# Patient Record
Sex: FEMALE
[De-identification: names, ages, dates, MRNs, and addresses within clinical notes are randomized per-mention and may not be internally consistent; named-entity substitution may affect disease eponyms.]

---

## 2017-02-13 ENCOUNTER — FORM ENCOUNTER (OUTPATIENT)
Age: 67
End: 2017-02-13

## 2020-08-12 ENCOUNTER — TRANSCRIPTION ENCOUNTER (OUTPATIENT)
Age: 70
End: 2020-08-12

## 2022-06-26 ENCOUNTER — FORM ENCOUNTER (OUTPATIENT)
Age: 72
End: 2022-06-26

## 2022-07-22 ENCOUNTER — OUTPATIENT (OUTPATIENT)
Dept: OUTPATIENT SERVICES | Facility: HOSPITAL | Age: 72
LOS: 1 days | End: 2022-07-22
Payer: MEDICARE

## 2022-07-22 VITALS
DIASTOLIC BLOOD PRESSURE: 82 MMHG | SYSTOLIC BLOOD PRESSURE: 159 MMHG | OXYGEN SATURATION: 96 % | RESPIRATION RATE: 16 BRPM | HEART RATE: 88 BPM | TEMPERATURE: 97 F | WEIGHT: 279.99 LBS

## 2022-07-22 DIAGNOSIS — Z98.890 OTHER SPECIFIED POSTPROCEDURAL STATES: Chronic | ICD-10-CM

## 2022-07-22 DIAGNOSIS — N85.00 ENDOMETRIAL HYPERPLASIA, UNSPECIFIED: ICD-10-CM

## 2022-07-22 DIAGNOSIS — Z01.818 ENCOUNTER FOR OTHER PREPROCEDURAL EXAMINATION: ICD-10-CM

## 2022-07-22 DIAGNOSIS — E89.2 POSTPROCEDURAL HYPOPARATHYROIDISM: Chronic | ICD-10-CM

## 2022-07-22 LAB
ALBUMIN SERPL ELPH-MCNC: 3.5 G/DL — SIGNIFICANT CHANGE UP (ref 3.3–5)
ALP SERPL-CCNC: 87 U/L — SIGNIFICANT CHANGE UP (ref 40–120)
ALT FLD-CCNC: 17 U/L — SIGNIFICANT CHANGE UP (ref 12–78)
ANION GAP SERPL CALC-SCNC: 4 MMOL/L — LOW (ref 5–17)
AST SERPL-CCNC: 16 U/L — SIGNIFICANT CHANGE UP (ref 15–37)
BILIRUB SERPL-MCNC: 0.2 MG/DL — SIGNIFICANT CHANGE UP (ref 0.2–1.2)
BLD GP AB SCN SERPL QL: SIGNIFICANT CHANGE UP
BUN SERPL-MCNC: 18 MG/DL — SIGNIFICANT CHANGE UP (ref 7–23)
CALCIUM SERPL-MCNC: 8.7 MG/DL — SIGNIFICANT CHANGE UP (ref 8.5–10.1)
CHLORIDE SERPL-SCNC: 107 MMOL/L — SIGNIFICANT CHANGE UP (ref 96–108)
CO2 SERPL-SCNC: 31 MMOL/L — SIGNIFICANT CHANGE UP (ref 22–31)
CREAT SERPL-MCNC: 1.4 MG/DL — HIGH (ref 0.5–1.3)
EGFR: 40 ML/MIN/1.73M2 — LOW
GLUCOSE SERPL-MCNC: 93 MG/DL — SIGNIFICANT CHANGE UP (ref 70–99)
HCT VFR BLD CALC: 41.5 % — SIGNIFICANT CHANGE UP (ref 34.5–45)
HGB BLD-MCNC: 13.6 G/DL — SIGNIFICANT CHANGE UP (ref 11.5–15.5)
MCHC RBC-ENTMCNC: 30.8 PG — SIGNIFICANT CHANGE UP (ref 27–34)
MCHC RBC-ENTMCNC: 32.8 GM/DL — SIGNIFICANT CHANGE UP (ref 32–36)
MCV RBC AUTO: 94.1 FL — SIGNIFICANT CHANGE UP (ref 80–100)
NRBC # BLD: 0 /100 WBCS — SIGNIFICANT CHANGE UP (ref 0–0)
PLATELET # BLD AUTO: 218 K/UL — SIGNIFICANT CHANGE UP (ref 150–400)
POTASSIUM SERPL-MCNC: 4.5 MMOL/L — SIGNIFICANT CHANGE UP (ref 3.5–5.3)
POTASSIUM SERPL-SCNC: 4.5 MMOL/L — SIGNIFICANT CHANGE UP (ref 3.5–5.3)
PROT SERPL-MCNC: 7 G/DL — SIGNIFICANT CHANGE UP (ref 6–8.3)
RBC # BLD: 4.41 M/UL — SIGNIFICANT CHANGE UP (ref 3.8–5.2)
RBC # FLD: 13.2 % — SIGNIFICANT CHANGE UP (ref 10.3–14.5)
SARS-COV-2 RNA SPEC QL NAA+PROBE: SIGNIFICANT CHANGE UP
SODIUM SERPL-SCNC: 142 MMOL/L — SIGNIFICANT CHANGE UP (ref 135–145)
WBC # BLD: 7.84 K/UL — SIGNIFICANT CHANGE UP (ref 3.8–10.5)
WBC # FLD AUTO: 7.84 K/UL — SIGNIFICANT CHANGE UP (ref 3.8–10.5)

## 2022-07-22 PROCEDURE — 36415 COLL VENOUS BLD VENIPUNCTURE: CPT

## 2022-07-22 PROCEDURE — 86850 RBC ANTIBODY SCREEN: CPT

## 2022-07-22 PROCEDURE — 86901 BLOOD TYPING SEROLOGIC RH(D): CPT

## 2022-07-22 PROCEDURE — 85027 COMPLETE CBC AUTOMATED: CPT

## 2022-07-22 PROCEDURE — G0463: CPT

## 2022-07-22 PROCEDURE — 86900 BLOOD TYPING SEROLOGIC ABO: CPT

## 2022-07-22 PROCEDURE — U0005: CPT

## 2022-07-22 PROCEDURE — 80053 COMPREHEN METABOLIC PANEL: CPT

## 2022-07-22 PROCEDURE — U0003: CPT

## 2022-07-22 RX ORDER — VALSARTAN 80 MG/1
0 TABLET ORAL
Qty: 0 | Refills: 0 | DISCHARGE

## 2022-07-22 RX ORDER — LEVOTHYROXINE SODIUM 125 MCG
0 TABLET ORAL
Qty: 0 | Refills: 0 | DISCHARGE

## 2022-07-22 NOTE — H&P PST ADULT - NSICDXFAMILYHX_GEN_ALL_CORE_FT
FAMILY HISTORY:  Father  Still living? No  FH: bladder cancer, Age at diagnosis: Age Unknown  FH: CAD (coronary artery disease), Age at diagnosis: Age Unknown    Mother  Still living? No  FH: brain aneurysm, Age at diagnosis: Age Unknown

## 2022-07-22 NOTE — H&P PST ADULT - NSICDXPASTMEDICALHX_GEN_ALL_CORE_FT
PAST MEDICAL HISTORY:  Hypothyroidism      PAST MEDICAL HISTORY:  Endometrial hyperplasia     Hypertension     Hypothyroidism

## 2022-07-22 NOTE — H&P PST ADULT - HISTORY OF PRESENT ILLNESS
73 y/o female with PMH of HTN and hypothyroidism here for PST. Pt states "I had a twinge of abdominal pain about 2 months ago and then I saw my GYN". Pt s/p sonogram and pt states "there was an endometrial stripe". Pt denies postmenopausal bleeding and vaginal cramping. Pt electing for dilation and curettage hysteroscopy with myosure on 7/27/2022.

## 2022-07-25 RX ORDER — SODIUM CHLORIDE 9 MG/ML
1000 INJECTION, SOLUTION INTRAVENOUS
Refills: 0 | Status: DISCONTINUED | OUTPATIENT
Start: 2022-07-27 | End: 2022-08-10

## 2022-07-26 ENCOUNTER — TRANSCRIPTION ENCOUNTER (OUTPATIENT)
Age: 72
End: 2022-07-26

## 2022-07-26 NOTE — ASU PATIENT PROFILE, ADULT - FALL HARM RISK - UNIVERSAL INTERVENTIONS
Bed in lowest position, wheels locked, appropriate side rails in place/Call bell, personal items and telephone in reach/Instruct patient to call for assistance before getting out of bed or chair/Non-slip footwear when patient is out of bed/[place] to call system/Physically safe environment - no spills, clutter or unnecessary equipment/Purposeful Proactive Rounding/Room/bathroom lighting operational, light cord in reach

## 2022-07-27 ENCOUNTER — TRANSCRIPTION ENCOUNTER (OUTPATIENT)
Age: 72
End: 2022-07-27

## 2022-07-27 ENCOUNTER — OUTPATIENT (OUTPATIENT)
Dept: OUTPATIENT SERVICES | Facility: HOSPITAL | Age: 72
LOS: 1 days | End: 2022-07-27
Payer: MEDICARE

## 2022-07-27 ENCOUNTER — RESULT REVIEW (OUTPATIENT)
Age: 72
End: 2022-07-27

## 2022-07-27 VITALS
SYSTOLIC BLOOD PRESSURE: 147 MMHG | TEMPERATURE: 98 F | RESPIRATION RATE: 14 BRPM | OXYGEN SATURATION: 95 % | WEIGHT: 279.99 LBS | HEART RATE: 77 BPM | DIASTOLIC BLOOD PRESSURE: 79 MMHG

## 2022-07-27 VITALS
HEART RATE: 64 BPM | OXYGEN SATURATION: 97 % | RESPIRATION RATE: 16 BRPM | DIASTOLIC BLOOD PRESSURE: 63 MMHG | SYSTOLIC BLOOD PRESSURE: 114 MMHG

## 2022-07-27 DIAGNOSIS — Z98.890 OTHER SPECIFIED POSTPROCEDURAL STATES: Chronic | ICD-10-CM

## 2022-07-27 DIAGNOSIS — E89.2 POSTPROCEDURAL HYPOPARATHYROIDISM: Chronic | ICD-10-CM

## 2022-07-27 DIAGNOSIS — N85.00 ENDOMETRIAL HYPERPLASIA, UNSPECIFIED: ICD-10-CM

## 2022-07-27 DIAGNOSIS — Z01.818 ENCOUNTER FOR OTHER PREPROCEDURAL EXAMINATION: ICD-10-CM

## 2022-07-27 LAB — ABO RH CONFIRMATION: SIGNIFICANT CHANGE UP

## 2022-07-27 PROCEDURE — 88305 TISSUE EXAM BY PATHOLOGIST: CPT

## 2022-07-27 PROCEDURE — 36415 COLL VENOUS BLD VENIPUNCTURE: CPT

## 2022-07-27 PROCEDURE — 88305 TISSUE EXAM BY PATHOLOGIST: CPT | Mod: 26

## 2022-07-27 PROCEDURE — 58558 HYSTEROSCOPY BIOPSY: CPT

## 2022-07-27 DEVICE — MYOSURE TISSUE REMOVAL DEVICE LITE: Type: IMPLANTABLE DEVICE | Status: FUNCTIONAL

## 2022-07-27 RX ORDER — ONDANSETRON 8 MG/1
4 TABLET, FILM COATED ORAL ONCE
Refills: 0 | Status: DISCONTINUED | OUTPATIENT
Start: 2022-07-27 | End: 2022-07-27

## 2022-07-27 RX ORDER — HYDROMORPHONE HYDROCHLORIDE 2 MG/ML
1 INJECTION INTRAMUSCULAR; INTRAVENOUS; SUBCUTANEOUS
Refills: 0 | Status: DISCONTINUED | OUTPATIENT
Start: 2022-07-27 | End: 2022-07-27

## 2022-07-27 RX ORDER — HYDROMORPHONE HYDROCHLORIDE 2 MG/ML
0.5 INJECTION INTRAMUSCULAR; INTRAVENOUS; SUBCUTANEOUS
Refills: 0 | Status: DISCONTINUED | OUTPATIENT
Start: 2022-07-27 | End: 2022-07-27

## 2022-07-27 RX ORDER — SODIUM CHLORIDE 9 MG/ML
1000 INJECTION, SOLUTION INTRAVENOUS
Refills: 0 | Status: DISCONTINUED | OUTPATIENT
Start: 2022-07-27 | End: 2022-07-27

## 2022-07-27 RX ADMIN — SODIUM CHLORIDE 60 MILLILITER(S): 9 INJECTION, SOLUTION INTRAVENOUS at 08:16

## 2022-07-27 RX ADMIN — SODIUM CHLORIDE 75 MILLILITER(S): 9 INJECTION, SOLUTION INTRAVENOUS at 13:50

## 2022-07-27 NOTE — ASU DISCHARGE PLAN (ADULT/PEDIATRIC) - CARE PROVIDER_API CALL
Sunshine Del Toro)  Obstetrics and Gynecology  [address] [place] [address]  Phone: [phone]  Fax: [phone]  Established Patient  Follow Up Time: 1 week

## 2022-07-27 NOTE — BRIEF OPERATIVE NOTE - OPERATION/FINDINGS
Small anteverted uterus, no adnexal masses  cervix L/C/P  vulva and vagina atrophic and wnl, introitus was verginal  uterus sounding to 5cm  extensive intrauterine synechia with uterine. irregular mass arising from the 1 o'clock position Small anteverted uterus, no adnexal masses  cervix L/C/P  vulva and vagina atrophic and wnl, introitus was virginal  uterus sounding to 5cm  extensive intrauterine synechia with uterine. irregular mass arising from the 1 o'clock position polyp vs fibroid
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
## 2022-07-27 NOTE — BRIEF OPERATIVE NOTE - NSICDXBRIEFPROCEDURE_GEN_ALL_CORE_FT
PROCEDURES:  Hysteroscopy, with dilation and curettage of uterus and polypectomy or uterine myomectomy using Myotravelmobre tissue removal system 27-Jul-2022 13:08:36  Sunshine Del Toro

## 2022-07-27 NOTE — BRIEF OPERATIVE NOTE - NSICDXBRIEFPOSTOP_GEN_ALL_CORE_FT
POST-OP DIAGNOSIS:  Thickened endometrium 27-Jul-2022 12:32:38  Sunshine Del Toro   POST-OP DIAGNOSIS:  Thickened endometrium 27-Jul-2022 12:32:38  Sunshine Del Toro  Uterine synechiae 27-Jul-2022 13:09:17  Sunshine Del Toro  Endometrial mass 27-Jul-2022 13:10:12  Sunshine Del Toro

## 2022-07-27 NOTE — ASU DISCHARGE PLAN (ADULT/PEDIATRIC) - NS MD DC FALL RISK RISK
For information on Fall & Injury Prevention, visit: [email or web address] OR  [email or web address] OR  https://www.cdc.gov/steadi/patient.html

## 2022-07-29 LAB — SURGICAL PATHOLOGY STUDY: SIGNIFICANT CHANGE UP

## 2022-09-26 ENCOUNTER — FORM ENCOUNTER (OUTPATIENT)
Age: 72
End: 2022-09-26

## 2022-10-06 PROBLEM — E03.9 HYPOTHYROIDISM, UNSPECIFIED: Chronic | Status: ACTIVE | Noted: 2022-07-22

## 2022-10-06 PROBLEM — N85.00 ENDOMETRIAL HYPERPLASIA, UNSPECIFIED: Chronic | Status: ACTIVE | Noted: 2022-07-22

## 2022-10-06 PROBLEM — I10 ESSENTIAL (PRIMARY) HYPERTENSION: Chronic | Status: ACTIVE | Noted: 2022-07-22

## 2022-10-07 PROBLEM — Z00.00 ENCOUNTER FOR PREVENTIVE HEALTH EXAMINATION: Status: ACTIVE | Noted: 2022-10-07

## 2022-10-10 ENCOUNTER — NON-APPOINTMENT (OUTPATIENT)
Age: 72
End: 2022-10-10

## 2022-10-11 ENCOUNTER — NON-APPOINTMENT (OUTPATIENT)
Age: 72
End: 2022-10-11

## 2022-10-12 ENCOUNTER — TRANSCRIPTION ENCOUNTER (OUTPATIENT)
Age: 72
End: 2022-10-12

## 2022-10-12 ENCOUNTER — APPOINTMENT (OUTPATIENT)
Dept: GYNECOLOGIC ONCOLOGY | Facility: CLINIC | Age: 72
End: 2022-10-12

## 2022-10-12 VITALS
WEIGHT: 276 LBS | SYSTOLIC BLOOD PRESSURE: 150 MMHG | DIASTOLIC BLOOD PRESSURE: 80 MMHG | BODY MASS INDEX: 41.83 KG/M2 | HEIGHT: 68 IN

## 2022-10-12 DIAGNOSIS — Z01.818 ENCOUNTER FOR OTHER PREPROCEDURAL EXAMINATION: ICD-10-CM

## 2022-10-12 DIAGNOSIS — Z80.8 FAMILY HISTORY OF MALIGNANT NEOPLASM OF OTHER ORGANS OR SYSTEMS: ICD-10-CM

## 2022-10-12 DIAGNOSIS — G47.30 SLEEP APNEA, UNSPECIFIED: ICD-10-CM

## 2022-10-12 DIAGNOSIS — Z78.9 OTHER SPECIFIED HEALTH STATUS: ICD-10-CM

## 2022-10-12 DIAGNOSIS — Z80.52 FAMILY HISTORY OF MALIGNANT NEOPLASM OF BLADDER: ICD-10-CM

## 2022-10-12 DIAGNOSIS — E03.9 HYPOTHYROIDISM, UNSPECIFIED: ICD-10-CM

## 2022-10-12 DIAGNOSIS — R93.89 ABNORMAL FINDINGS ON DIAGNOSTIC IMAGING OF OTHER SPECIFIED BODY STRUCTURES: ICD-10-CM

## 2022-10-12 DIAGNOSIS — Z80.42 FAMILY HISTORY OF MALIGNANT NEOPLASM OF PROSTATE: ICD-10-CM

## 2022-10-12 DIAGNOSIS — I10 ESSENTIAL (PRIMARY) HYPERTENSION: ICD-10-CM

## 2022-10-12 PROCEDURE — 99204 OFFICE O/P NEW MOD 45 MIN: CPT

## 2022-10-12 NOTE — DISCUSSION/SUMMARY
[Reviewed Radiology Report(s)] : Radiology reports were reviewed. [Reviewed Radiology Film/Image(s)] : Images from radiology studies were reviewed and examined. [Discuss Alternatives/Risks/Benefits w/Patient] : All alternatives, risks, and benefits were discussed with the patient/family and all questions were answered.  Patient expressed good understanding and appreciates the importance of follow up as recommended. [Pre Op] : The differential diagnosis was discussed in detail. The indications, risks, benefits and alternatives were discussed. [unfilled] expressed an understanding of the treatment rationale and her questions were answered to her apparent satisfaction.

## 2022-10-13 PROBLEM — Z80.8 FAMILY HISTORY OF MALIGNANT MELANOMA: Status: ACTIVE | Noted: 2022-10-13

## 2022-10-13 PROBLEM — Z80.42 FAMILY HISTORY OF MALIGNANT NEOPLASM OF PROSTATE: Status: ACTIVE | Noted: 2022-10-13

## 2022-10-13 PROBLEM — E03.9 HYPOTHYROIDISM: Status: ACTIVE | Noted: 2022-10-13

## 2022-10-13 PROBLEM — Z01.818 PREOP TESTING: Status: ACTIVE | Noted: 2022-10-13

## 2022-10-13 PROBLEM — G47.30 SLEEP APNEA: Status: ACTIVE | Noted: 2022-10-13

## 2022-10-13 PROBLEM — Z78.9 DOES NOT USE TOBACCO: Status: ACTIVE | Noted: 2022-10-13

## 2022-10-13 PROBLEM — Z78.9 DENIES ALCOHOL CONSUMPTION: Status: ACTIVE | Noted: 2022-10-13

## 2022-10-13 PROBLEM — I10 BENIGN ESSENTIAL HYPERTENSION: Status: ACTIVE | Noted: 2022-10-13

## 2022-10-13 PROBLEM — Z80.52 FAMILY HISTORY OF MALIGNANT NEOPLASM OF URINARY BLADDER: Status: ACTIVE | Noted: 2022-10-13

## 2022-10-13 PROBLEM — R93.89 THICKENED ENDOMETRIUM: Status: ACTIVE | Noted: 2022-10-13

## 2022-10-13 RX ORDER — VALSARTAN 40 MG/1
TABLET ORAL
Refills: 0 | Status: ACTIVE | COMMUNITY

## 2022-10-13 RX ORDER — LEVOTHYROXINE SODIUM 137 UG/1
TABLET ORAL
Refills: 0 | Status: ACTIVE | COMMUNITY

## 2022-10-17 NOTE — PHYSICAL EXAM
[Chaperone Present] : A chaperone was present in the examining room during all aspects of the physical examination [Abnormal] : General appearance: Abnormal [Normal] : Recto-Vaginal Exam: Normal [de-identified] : No thickening or nodularities [FreeTextEntry1] : obese

## 2022-10-17 NOTE — ASSESSMENT
[FreeTextEntry1] : 71 yo female with thickened endometrial lining of 17mm.\par \par After speaking with the patient and reviewing her pelvic ultrasound images, I agree that her endometrial lining is thickened. We discussed risk factors for endometrial cancer, including increased BMI. However, she does not have a diagnosis of Diabetes and she has never had any post menopausal bleeding. I have a low suspicion for cancer at this time. We discussed options of either observing with imaging or surgical intervention. She prefers surgical intervention at this time. I discussed at length with the patient the nature, purpose, risks, benefits, and alternatives of pelvic examination under anesthesia, total laparoscopic hysterectomy with bilateral salpingo-oophorectomy, sentinel lymph node mapping with possible node biopsies.  The patient understands the risks to include (but not be limited to) bowel injury, bleeding (with the possible need for transfusion), bladder or ureteral injury, infections, deep venous thrombosis, and christofer-operative death.  The patient also understands that her surgery may not be able to be performed laparoscopically and that she may need a laparotomy (either vertical midline or pfannensteil).  She also understands the limitations of laparoscopic surgery and the possibility of missing a surgical complication with need for subsequent re-exploration.  She agrees to proceed.  She asked numerous questions which were answered to her satisfaction. She also understands the rationale for a possible cystoscopy at the completion of the procedure and the potential risks of cystoscopy.\par \par All questions and concerns were answered to patient's apparent satisfaction.

## 2022-10-17 NOTE — CHIEF COMPLAINT
[FreeTextEntry1] : [facility]\par [address]\[place] [address]\par [phone]\par \par Thickened endometrium\par \par

## 2022-10-17 NOTE — END OF VISIT
[FreeTextEntry3] : This note was written by Nikki Vasquez, acting as a scribe for Dr. Bee Hennessy.\par This note accurately reflects the work and decisions made by me.\par

## 2022-10-17 NOTE — HISTORY OF PRESENT ILLNESS
[FreeTextEntry1] : 73 yo nulligravida LMP 2005 referred by Dr. Sunshine Del Toro, presents to office for evaluation of thickened endometrium. Patient underwent D&C, hysteroscopy, polypectomy on 7/27/22 by Dr. Del Toro. Operative findings revealed virginal introitus, uterus sounding to 5cm, extensive intrauterine synechia with irregular mass arising from the 1:00 position. Pathology revealed ECC with mucus and scant benign endocervical epithelium and squamous metaplasia; myosure shavings were benign endocervical polyp, no endometrial tissue identified. Pelvic US on 9/26/22 showed AV uterus 9.2 x 4.6 x 5.9 cm w/ two fibroids. Endometrial thickness measuring 17mm, cystic spaces within endometrium; nabothian cysts. Bilateral ovaries WNL. Of note, patient had a pelvic US in 2017 with endometrial thickness of 4-6mm. Denies unintentional weight loss, abdominopelvic pain, bleeding, change in urinary or bowel habits.\par \par PCP: Dr. Manny Vo\par \par Last pap: within last 3 years- WNL, as per patient. Denies history of abnormal pap smears.\par Last mammo: 9/27/22- BR1\par Last colo: 12/2021- Benign polyps, as per patient. Repeat in 2025.\par Last DEXA: 6/2022- Osteopenia\par \par PSH- parathyroidectomy 2010\par FH- brain aneurysm - mother; no gyn cancers. Father-bladder CA, melanoma, prostate CA\par Social- no tobacco, alcohol, single\par Meds- Synthroid, valsartan\par All- PCN- unsure of reaction\par \par

## 2022-10-27 ENCOUNTER — OUTPATIENT (OUTPATIENT)
Dept: OUTPATIENT SERVICES | Facility: HOSPITAL | Age: 72
LOS: 1 days | End: 2022-10-27
Payer: MEDICARE

## 2022-10-27 VITALS
HEART RATE: 84 BPM | OXYGEN SATURATION: 95 % | WEIGHT: 278.66 LBS | SYSTOLIC BLOOD PRESSURE: 140 MMHG | RESPIRATION RATE: 20 BRPM | TEMPERATURE: 97 F | HEIGHT: 68 IN | DIASTOLIC BLOOD PRESSURE: 80 MMHG

## 2022-10-27 DIAGNOSIS — Z98.890 OTHER SPECIFIED POSTPROCEDURAL STATES: Chronic | ICD-10-CM

## 2022-10-27 DIAGNOSIS — E89.2 POSTPROCEDURAL HYPOPARATHYROIDISM: Chronic | ICD-10-CM

## 2022-10-27 DIAGNOSIS — Z01.818 ENCOUNTER FOR OTHER PREPROCEDURAL EXAMINATION: ICD-10-CM

## 2022-10-27 DIAGNOSIS — I10 ESSENTIAL (PRIMARY) HYPERTENSION: ICD-10-CM

## 2022-10-27 DIAGNOSIS — Z29.9 ENCOUNTER FOR PROPHYLACTIC MEASURES, UNSPECIFIED: ICD-10-CM

## 2022-10-27 DIAGNOSIS — R93.89 ABNORMAL FINDINGS ON DIAGNOSTIC IMAGING OF OTHER SPECIFIED BODY STRUCTURES: ICD-10-CM

## 2022-10-27 LAB
A1C WITH ESTIMATED AVERAGE GLUCOSE RESULT: 5.8 % — HIGH (ref 4–5.6)
ANION GAP SERPL CALC-SCNC: 9 MMOL/L — SIGNIFICANT CHANGE UP (ref 5–17)
APTT BLD: 28 SEC — SIGNIFICANT CHANGE UP (ref 27.5–35.5)
BASOPHILS # BLD AUTO: 0.06 K/UL — SIGNIFICANT CHANGE UP (ref 0–0.2)
BASOPHILS NFR BLD AUTO: 0.8 % — SIGNIFICANT CHANGE UP (ref 0–2)
BLD GP AB SCN SERPL QL: SIGNIFICANT CHANGE UP
BUN SERPL-MCNC: 15.1 MG/DL — SIGNIFICANT CHANGE UP (ref 8–20)
CALCIUM SERPL-MCNC: 9.4 MG/DL — SIGNIFICANT CHANGE UP (ref 8.4–10.5)
CHLORIDE SERPL-SCNC: 102 MMOL/L — SIGNIFICANT CHANGE UP (ref 96–108)
CO2 SERPL-SCNC: 29 MMOL/L — SIGNIFICANT CHANGE UP (ref 22–29)
CREAT SERPL-MCNC: 1.4 MG/DL — HIGH (ref 0.5–1.3)
EGFR: 40 ML/MIN/1.73M2 — LOW
EOSINOPHIL # BLD AUTO: 0.3 K/UL — SIGNIFICANT CHANGE UP (ref 0–0.5)
EOSINOPHIL NFR BLD AUTO: 4 % — SIGNIFICANT CHANGE UP (ref 0–6)
ESTIMATED AVERAGE GLUCOSE: 120 MG/DL — HIGH (ref 68–114)
GLUCOSE SERPL-MCNC: 100 MG/DL — HIGH (ref 70–99)
HCT VFR BLD CALC: 42.6 % — SIGNIFICANT CHANGE UP (ref 34.5–45)
HGB BLD-MCNC: 13.9 G/DL — SIGNIFICANT CHANGE UP (ref 11.5–15.5)
IMM GRANULOCYTES NFR BLD AUTO: 0.3 % — SIGNIFICANT CHANGE UP (ref 0–0.9)
INR BLD: 1 RATIO — SIGNIFICANT CHANGE UP (ref 0.88–1.16)
LYMPHOCYTES # BLD AUTO: 2.13 K/UL — SIGNIFICANT CHANGE UP (ref 1–3.3)
LYMPHOCYTES # BLD AUTO: 28.6 % — SIGNIFICANT CHANGE UP (ref 13–44)
MAGNESIUM SERPL-MCNC: 2.2 MG/DL — SIGNIFICANT CHANGE UP (ref 1.6–2.6)
MCHC RBC-ENTMCNC: 30.3 PG — SIGNIFICANT CHANGE UP (ref 27–34)
MCHC RBC-ENTMCNC: 32.6 GM/DL — SIGNIFICANT CHANGE UP (ref 32–36)
MCV RBC AUTO: 93 FL — SIGNIFICANT CHANGE UP (ref 80–100)
MONOCYTES # BLD AUTO: 0.48 K/UL — SIGNIFICANT CHANGE UP (ref 0–0.9)
MONOCYTES NFR BLD AUTO: 6.5 % — SIGNIFICANT CHANGE UP (ref 2–14)
NEUTROPHILS # BLD AUTO: 4.45 K/UL — SIGNIFICANT CHANGE UP (ref 1.8–7.4)
NEUTROPHILS NFR BLD AUTO: 59.8 % — SIGNIFICANT CHANGE UP (ref 43–77)
PHOSPHATE SERPL-MCNC: 3.2 MG/DL — SIGNIFICANT CHANGE UP (ref 2.4–4.7)
PLATELET # BLD AUTO: 236 K/UL — SIGNIFICANT CHANGE UP (ref 150–400)
POTASSIUM SERPL-MCNC: 5.1 MMOL/L — SIGNIFICANT CHANGE UP (ref 3.5–5.3)
POTASSIUM SERPL-SCNC: 5.1 MMOL/L — SIGNIFICANT CHANGE UP (ref 3.5–5.3)
PROTHROM AB SERPL-ACNC: 11.6 SEC — SIGNIFICANT CHANGE UP (ref 10.5–13.4)
RBC # BLD: 4.58 M/UL — SIGNIFICANT CHANGE UP (ref 3.8–5.2)
RBC # FLD: 13.1 % — SIGNIFICANT CHANGE UP (ref 10.3–14.5)
SODIUM SERPL-SCNC: 140 MMOL/L — SIGNIFICANT CHANGE UP (ref 135–145)
WBC # BLD: 7.44 K/UL — SIGNIFICANT CHANGE UP (ref 3.8–10.5)
WBC # FLD AUTO: 7.44 K/UL — SIGNIFICANT CHANGE UP (ref 3.8–10.5)

## 2022-10-27 PROCEDURE — 93005 ELECTROCARDIOGRAM TRACING: CPT

## 2022-10-27 PROCEDURE — G0463: CPT

## 2022-10-27 PROCEDURE — 93010 ELECTROCARDIOGRAM REPORT: CPT

## 2022-10-27 RX ORDER — GENTAMICIN SULFATE 40 MG/ML
340 VIAL (ML) INJECTION ONCE
Refills: 0 | Status: COMPLETED | OUTPATIENT
Start: 2022-11-04 | End: 2022-11-04

## 2022-10-27 RX ORDER — IBUPROFEN 200 MG
3 TABLET ORAL
Qty: 0 | Refills: 0 | DISCHARGE

## 2022-10-27 NOTE — H&P PST ADULT - NSICDXPASTMEDICALHX_GEN_ALL_CORE_FT
PAST MEDICAL HISTORY:  Abnormal findings on diagnostic imaging of other specified body structures     Endometrial hyperplasia     Hypertension     Hypothyroidism      PAST MEDICAL HISTORY:  Abnormal findings on diagnostic imaging of other specified body structures     Endometrial hyperplasia     Hypertension     Hypothyroidism     Morbidly obese BMI 42.4

## 2022-10-27 NOTE — H&P PST ADULT - ASSESSMENT
Pt is a 72 years old female, nulligravida LMP ,  seen today pre-op Pelvic exam under anesthesia, Robotic assisted total laparoscopic hysterectomy, bilateral salpingooophorectomy, possible cystoscopy, possible laparotomy, sentinel lymph node mapping lymph node. Pt was referred by Dr. Sunshine Del Toro for  thickened endometrium. Pt underwent D&C, hysteroscopy, polypectomy on 22 by Dr. Del Toro,  findings revealed virginal introitus, uterus sounding to 5cm, extensive intrauterine synechia with irregular mass arising from the 1:00 position. Pathology revealed ECC with mucus and scant benign endocervical epithelium and squamous metaplasia,  Myosure shavings were benign endocervical polyp, no endometrial tissue identified. Pt denies any change in bowel and bladder, denies weight loss, denies personal hx of malignant neoplasm. Pt medical hx includes HTN, Hypothyroidism, Endometrial hyperplasia, AQUILINO(Use a mouth piece), morbidly obese, BMI 42.4.  Pt seen today for a scheduled surgery on 2022 with Dr. Hennessy. Surgery protocol reviewed with Pt today. Pt to follow-up with PCP for medical clearance, instruction for COVID PCR test requirement prior to this procedure provided to Pt today.   CAPRINI VTE 2.0 SCORE [CLOT updated 2019]    AGE RELATED RISK FACTORS                                                       MOBILITY RELATED FACTORS  [ ] Age 41-60 years                                            (1 Point)                    [ ] Bed rest                                                        (1 Point)  [x ] Age: 61-74 years                                           (2 Points)                  [ ] Plaster cast                                                   (2 Points)  [ ] Age= 75 years                                              (3 Points)                    [ ] Bed bound for more than 72 hours                 (2 Points)    DISEASE RELATED RISK FACTORS                                               GENDER SPECIFIC FACTORS  [ ] Edema in the lower extremities                       (1 Point)              [ ] Pregnancy                                                     (1 Point)  [ ] Varicose veins                                               (1 Point)                     [ ] Post-partum < 6 weeks                                   (1 Point)             [ x] BMI > 25 Kg/m2                                            (1 Point)                     [ ] Hormonal therapy  or oral contraception          (1 Point)                 [ ] Sepsis (in the previous month)                        (1 Point)               [ ] History of pregnancy complications                 (1 point)  [ ] Pneumonia or serious lung disease                                               [ ] Unexplained or recurrent                     (1 Point)           (in the previous month)                               (1 Point)  [ ] Abnormal pulmonary function test                     (1 Point)                 SURGERY RELATED RISK FACTORS  [ ] Acute myocardial infarction                              (1 Point)               [ ]  Section                                             (1 Point)  [ ] Congestive heart failure (in the previous month)  (1 Point)      [ ] Minor surgery                                                  (1 Point)   [ ] Inflammatory bowel disease                             (1 Point)               [ ] Arthroscopic surgery                                        (2 Points)  [ ] Central venous access                                      (2 Points)                [x ] General surgery lasting more than 45 minutes (2 points)  [ ] Malignancy- Present or previous                   (2 Points)                [ ] Elective arthroplasty                                         (5 points)    [ ] Stroke (in the previous month)                          (5 Points)                                                                                                                                                           HEMATOLOGY RELATED FACTORS                                                 TRAUMA RELATED RISK FACTORS  [ ] Prior episodes of VTE                                     (3 Points)                [ ] Fracture of the hip, pelvis, or leg                       (5 Points)  [ ] Positive family history for VTE                         (3 Points)             [ ] Acute spinal cord injury (in the previous month)  (5 Points)  [ ] Prothrombin 91162 A                                     (3 Points)               [ ] Paralysis  (less than 1 month)                             (5 Points)  [ ] Factor V Leiden                                             (3 Points)                  [ ] Multiple Trauma within 1 month                        (5 Points)  [ ] Lupus anticoagulants                                     (3 Points)                                                           [ ] Anticardiolipin antibodies                               (3 Points)                                                       [ ] High homocysteine in the blood                      (3 Points)                                             [ ] Other congenital or acquired thrombophilia      (3 Points)                                                [ ] Heparin induced thrombocytopenia                  (3 Points)                                     Total Score [   5       ]  OPIOID RISK TOOL    MARY EACH BOX THAT APPLIES AND ADD TOTALS AT THE END    FAMILY HISTORY OF SUBSTANCE ABUSE                 FEMALE         MALE                                                Alcohol                             [  ]1 pt          [  ]3pts                                               Illegal Durgs                     [  ]2 pts        [  ]3pts                                               Rx Drugs                           [  ]4 pts        [  ]4 pts    PERSONAL HISTORY OF SUBSTANCE ABUSE                                                                                          Alcohol                             [  ]3 pts       [  ]3 pts                                               Illegal Drugs                     [  ]4 pts        [  ]4 pts                                               Rx Drugs                           [  ]5 pts        [  ]5 pts    AGE BETWEEN 16-45 YEARS                                      [  ]1 pt         [  ]1 pt    HISTORY OF PREADOLESCENT   SEXUAL ABUSE                                                             [  ]3 pts        [  ]0pts    PSYCHOLOGICAL DISEASE                     ADD, OCD, Bipolar, Schizophrenia        [  ]2 pts         [  ]2 pts                      Depression                                               [  ]1 pt           [  ]1 pt           SCORING TOTAL   (add numbers and type here)              (*0**)                                     A score of 3 or lower indicated LOW risk for future opioid abuse  A score of 4 to 7 indicated moderate risk for future opioid abuse  A score of 8 or higher indicates a high risk for opioid abuse

## 2022-10-27 NOTE — H&P PST ADULT - SKIN
warm and dry/color normal/normal/no rashes/no ulcers I have reviewed and confirmed nurses' notes for patient's medications, allergies, medical history, and surgical history.

## 2022-10-27 NOTE — H&P PST ADULT - ATTENDING COMMENTS
Patient seen in the presurgical holding area for the informed consent discussion.  R/B/A were reviewed & understood; consent is signed & witnessed in the chart.

## 2022-10-27 NOTE — H&P PST ADULT - GASTROINTESTINAL
negative normal/soft/nontender/nondistended/normal active bowel sounds normal/soft/nontender/nondistended/normal active bowel sounds/no guarding/no rigidity/no organomegaly/no palpable nan

## 2022-10-27 NOTE — H&P PST ADULT - PROBLEM SELECTOR PLAN 3
Pelvic exam under anesthesia, Robotic assisted total laparoscopic hysterectomy, bilateral salpingooophorectomy, possible cystoscopy, possible laparotomy, sentinel lymph node mapping lymph node. Follow-up with PCP for medical clearance

## 2022-10-27 NOTE — H&P PST ADULT - BIRTH SEX
Patient called stating she was instructed to call and give update after taking Lipitor for a few weeks. Patient said she wakes up with spasms in the liver area.  Patient would like a call at   [facility] [phone]     
Please call the patient and let her know that I would like her to come in to have lab work drawn to evaluate for liver inflammation as well as pancreatic inflammation.  Find out if she can come in this afternoon for that lab work.  Also instructor to discontinue the atorvastatin now until we can get the results of the lab work.  
Spoke with patient and confirmed her pain is on the right side. \"I suppose it could be the pancreas but its so far to the right\". She reports it happens in the night infrequently but maybe 2-4 times she notices it. It does wake her up. She did some research and found that this is a side effect of the medication so she is calling to let MD know. Please advise if you would like patient to continue medication or not.  
Spoke with patient and notified. Patient verbalized understanding. She will come to the [place] lab now. No further questions or concerns at this time.    
Female

## 2022-10-27 NOTE — H&P PST ADULT - HISTORY OF PRESENT ILLNESS
Pt is a 72 years old female, nulligravida LMP 2005,  seen today pre-op Pelvic exam under anesthesia, Robotic assisted total laparoscopic hysterectomy, bilateral salpingooophorectomy, possible cystoscopy, possible laparotomy, sentinel lymph node mapping lymph node. Pt was referred by Dr. Sunshine Del Toro for  thickened endometrium. Pt underwent D&C, hysteroscopy, polypectomy on 7/27/22 by Dr. Del Toro,  findings revealed virginal introitus, uterus sounding to 5cm, extensive intrauterine synechia with irregular mass arising from the 1:00 position. Pathology revealed ECC with mucus and scant benign endocervical epithelium and squamous metaplasia,  Myosure shavings were benign endocervical polyp, no endometrial tissue identified. Pt denies any change in bowel and bladder, denies weight loss, denies personal hx of malignant neoplasm. Pt medical hx includes HTN, Hypothyroidism, Endometrial hyperplasia, AQUILINO(Use a mouth piece), morbidly obese, BMI 42.4.  Pt seen today for a scheduled surgery on 11/4/2022 with Dr. Hennessy.

## 2022-10-27 NOTE — H&P PST ADULT - NSICDXFAMILYHX_GEN_ALL_CORE_FT
FAMILY HISTORY:  Father  Still living? No  FH: bladder cancer, Age at diagnosis: Age Unknown  FH: CAD (coronary artery disease), Age at diagnosis: Age Unknown  FHx: prostate cancer, Age at diagnosis: Age Unknown    Mother  Still living? No  FH: brain aneurysm, Age at diagnosis: Age Unknown

## 2022-11-01 ENCOUNTER — TRANSCRIPTION ENCOUNTER (OUTPATIENT)
Age: 72
End: 2022-11-01

## 2022-11-02 LAB — SARS-COV-2 N GENE NPH QL NAA+PROBE: NOT DETECTED

## 2022-11-04 ENCOUNTER — RESULT REVIEW (OUTPATIENT)
Age: 72
End: 2022-11-04

## 2022-11-04 ENCOUNTER — TRANSCRIPTION ENCOUNTER (OUTPATIENT)
Age: 72
End: 2022-11-04

## 2022-11-04 ENCOUNTER — OUTPATIENT (OUTPATIENT)
Dept: OUTPATIENT SERVICES | Facility: HOSPITAL | Age: 72
LOS: 1 days | End: 2022-11-04
Payer: MEDICARE

## 2022-11-04 VITALS
TEMPERATURE: 98 F | DIASTOLIC BLOOD PRESSURE: 69 MMHG | HEART RATE: 71 BPM | SYSTOLIC BLOOD PRESSURE: 118 MMHG | WEIGHT: 278.66 LBS | HEIGHT: 68 IN | RESPIRATION RATE: 16 BRPM | OXYGEN SATURATION: 97 %

## 2022-11-04 VITALS
RESPIRATION RATE: 15 BRPM | OXYGEN SATURATION: 99 % | SYSTOLIC BLOOD PRESSURE: 106 MMHG | HEART RATE: 78 BPM | DIASTOLIC BLOOD PRESSURE: 71 MMHG

## 2022-11-04 DIAGNOSIS — N88.2 STRICTURE AND STENOSIS OF CERVIX UTERI: ICD-10-CM

## 2022-11-04 DIAGNOSIS — E89.2 POSTPROCEDURAL HYPOPARATHYROIDISM: Chronic | ICD-10-CM

## 2022-11-04 DIAGNOSIS — Z98.890 OTHER SPECIFIED POSTPROCEDURAL STATES: Chronic | ICD-10-CM

## 2022-11-04 DIAGNOSIS — R93.89 ABNORMAL FINDINGS ON DIAGNOSTIC IMAGING OF OTHER SPECIFIED BODY STRUCTURES: ICD-10-CM

## 2022-11-04 LAB
BASOPHILS # BLD AUTO: 0.03 K/UL — SIGNIFICANT CHANGE UP (ref 0–0.2)
BASOPHILS NFR BLD AUTO: 0.2 % — SIGNIFICANT CHANGE UP (ref 0–2)
EOSINOPHIL # BLD AUTO: 0.03 K/UL — SIGNIFICANT CHANGE UP (ref 0–0.5)
EOSINOPHIL NFR BLD AUTO: 0.2 % — SIGNIFICANT CHANGE UP (ref 0–6)
GLUCOSE BLDC GLUCOMTR-MCNC: 108 MG/DL — HIGH (ref 70–99)
GLUCOSE BLDC GLUCOMTR-MCNC: 144 MG/DL — HIGH (ref 70–99)
GLUCOSE BLDC GLUCOMTR-MCNC: 94 MG/DL — SIGNIFICANT CHANGE UP (ref 70–99)
HCT VFR BLD CALC: 40.8 % — SIGNIFICANT CHANGE UP (ref 34.5–45)
HGB BLD-MCNC: 13.5 G/DL — SIGNIFICANT CHANGE UP (ref 11.5–15.5)
IMM GRANULOCYTES NFR BLD AUTO: 0.3 % — SIGNIFICANT CHANGE UP (ref 0–0.9)
LYMPHOCYTES # BLD AUTO: 1.11 K/UL — SIGNIFICANT CHANGE UP (ref 1–3.3)
LYMPHOCYTES # BLD AUTO: 9 % — LOW (ref 13–44)
MCHC RBC-ENTMCNC: 30.6 PG — SIGNIFICANT CHANGE UP (ref 27–34)
MCHC RBC-ENTMCNC: 33.1 GM/DL — SIGNIFICANT CHANGE UP (ref 32–36)
MCV RBC AUTO: 92.5 FL — SIGNIFICANT CHANGE UP (ref 80–100)
MONOCYTES # BLD AUTO: 0.24 K/UL — SIGNIFICANT CHANGE UP (ref 0–0.9)
MONOCYTES NFR BLD AUTO: 1.9 % — LOW (ref 2–14)
NEUTROPHILS # BLD AUTO: 10.9 K/UL — HIGH (ref 1.8–7.4)
NEUTROPHILS NFR BLD AUTO: 88.4 % — HIGH (ref 43–77)
PLATELET # BLD AUTO: 213 K/UL — SIGNIFICANT CHANGE UP (ref 150–400)
RBC # BLD: 4.41 M/UL — SIGNIFICANT CHANGE UP (ref 3.8–5.2)
RBC # FLD: 12.9 % — SIGNIFICANT CHANGE UP (ref 10.3–14.5)
WBC # BLD: 12.35 K/UL — HIGH (ref 3.8–10.5)
WBC # FLD AUTO: 12.35 K/UL — HIGH (ref 3.8–10.5)

## 2022-11-04 PROCEDURE — 58571 TLH W/T/O 250 G OR LESS: CPT

## 2022-11-04 PROCEDURE — 82962 GLUCOSE BLOOD TEST: CPT

## 2022-11-04 PROCEDURE — 38570 LAPAROSCOPY LYMPH NODE BIOP: CPT | Mod: 80

## 2022-11-04 PROCEDURE — 88307 TISSUE EXAM BY PATHOLOGIST: CPT | Mod: 26

## 2022-11-04 PROCEDURE — 38570 LAPAROSCOPY LYMPH NODE BIOP: CPT

## 2022-11-04 PROCEDURE — 38900 IO MAP OF SENT LYMPH NODE: CPT | Mod: 50

## 2022-11-04 PROCEDURE — 88307 TISSUE EXAM BY PATHOLOGIST: CPT

## 2022-11-04 PROCEDURE — S2900: CPT

## 2022-11-04 PROCEDURE — C1889: CPT

## 2022-11-04 PROCEDURE — 88341 IMHCHEM/IMCYTCHM EA ADD ANTB: CPT | Mod: 26,59

## 2022-11-04 PROCEDURE — 38900 IO MAP OF SENT LYMPH NODE: CPT | Mod: 80,50

## 2022-11-04 PROCEDURE — 88112 CYTOPATH CELL ENHANCE TECH: CPT | Mod: 26

## 2022-11-04 PROCEDURE — 36415 COLL VENOUS BLD VENIPUNCTURE: CPT

## 2022-11-04 PROCEDURE — 58571 TLH W/T/O 250 G OR LESS: CPT | Mod: 80

## 2022-11-04 PROCEDURE — 88342 IMHCHEM/IMCYTCHM 1ST ANTB: CPT | Mod: 26

## 2022-11-04 PROCEDURE — 85025 COMPLETE CBC W/AUTO DIFF WBC: CPT

## 2022-11-04 PROCEDURE — C9399: CPT

## 2022-11-04 PROCEDURE — 88342 IMHCHEM/IMCYTCHM 1ST ANTB: CPT

## 2022-11-04 PROCEDURE — 88341 IMHCHEM/IMCYTCHM EA ADD ANTB: CPT

## 2022-11-04 PROCEDURE — 88112 CYTOPATH CELL ENHANCE TECH: CPT

## 2022-11-04 DEVICE — ARISTA 3GR
Type: IMPLANTABLE DEVICE | Status: NON-FUNCTIONAL
Removed: 2022-11-04

## 2022-11-04 RX ORDER — VANCOMYCIN HCL 1 G
1500 VIAL (EA) INTRAVENOUS ONCE
Refills: 0 | Status: COMPLETED | OUTPATIENT
Start: 2022-11-04 | End: 2022-11-04

## 2022-11-04 RX ORDER — FENTANYL CITRATE 50 UG/ML
50 INJECTION INTRAVENOUS
Refills: 0 | Status: DISCONTINUED | OUTPATIENT
Start: 2022-11-04 | End: 2022-11-04

## 2022-11-04 RX ORDER — VALSARTAN 80 MG/1
0 TABLET ORAL
Qty: 0 | Refills: 0 | DISCHARGE

## 2022-11-04 RX ORDER — BIMATOPROST 0.3 MG/ML
0 SOLUTION/ DROPS OPHTHALMIC
Qty: 0 | Refills: 0 | DISCHARGE

## 2022-11-04 RX ORDER — ONDANSETRON 8 MG/1
4 TABLET, FILM COATED ORAL ONCE
Refills: 0 | Status: DISCONTINUED | OUTPATIENT
Start: 2022-11-04 | End: 2022-11-18

## 2022-11-04 RX ORDER — KETOROLAC TROMETHAMINE 30 MG/ML
30 SYRINGE (ML) INJECTION ONCE
Refills: 0 | Status: DISCONTINUED | OUTPATIENT
Start: 2022-11-04 | End: 2022-11-04

## 2022-11-04 RX ORDER — SODIUM CHLORIDE 9 MG/ML
1000 INJECTION, SOLUTION INTRAVENOUS
Refills: 0 | Status: DISCONTINUED | OUTPATIENT
Start: 2022-11-04 | End: 2022-11-04

## 2022-11-04 RX ORDER — ACETAMINOPHEN 500 MG
2 TABLET ORAL
Qty: 0 | Refills: 0 | DISCHARGE

## 2022-11-04 RX ORDER — CELECOXIB 200 MG/1
400 CAPSULE ORAL ONCE
Refills: 0 | Status: COMPLETED | OUTPATIENT
Start: 2022-11-04 | End: 2022-11-04

## 2022-11-04 RX ORDER — LEVOTHYROXINE SODIUM 125 MCG
0 TABLET ORAL
Qty: 0 | Refills: 0 | DISCHARGE

## 2022-11-04 RX ORDER — SODIUM CHLORIDE 9 MG/ML
3 INJECTION INTRAMUSCULAR; INTRAVENOUS; SUBCUTANEOUS ONCE
Refills: 0 | Status: DISCONTINUED | OUTPATIENT
Start: 2022-11-04 | End: 2022-11-04

## 2022-11-04 RX ORDER — OXYCODONE HYDROCHLORIDE 5 MG/1
1 TABLET ORAL
Qty: 6 | Refills: 0
Start: 2022-11-04 | End: 2022-11-05

## 2022-11-04 RX ADMIN — Medication 200 MILLIGRAM(S): at 10:40

## 2022-11-04 RX ADMIN — CELECOXIB 400 MILLIGRAM(S): 200 CAPSULE ORAL at 07:47

## 2022-11-04 RX ADMIN — Medication 300 MILLIGRAM(S): at 09:59

## 2022-11-04 NOTE — PROGRESS NOTE ADULT - SUBJECTIVE AND OBJECTIVE BOX
GYNECOLOGIC ONCOLOGY PROGRESS NOTE    POD#0 s/p RA TLH, BSO, SLND    PROBLEMS:  Thickened endometrium      Pt seen and examined at bedside.     SUBJECTIVE:    Patient is without complaints.  Pain well-controlled.  Flatus: Denies  Denies Nausea, Vomiting or Diarrhea.  Denies shortness of breath, chest pain or dyspnea on exertion.  Tolerating diet.    OBJECTIVE:     VITALS:  T(F): 97.5 (11-04-22 @ 15:30), Max: 97.5 (11-04-22 @ 07:46)  HR: 69 (11-04-22 @ 16:00) (60 - 83)  BP: 109/57 (11-04-22 @ 16:00) (97/60 - 130/71)  RR: 15 (11-04-22 @ 16:00) (12 - 17)  SpO2: 95% (11-04-22 @ 16:00) (95% - 99%)  Wt(kg): --    I&O's Summary      MEDICATIONS  (STANDING):  lactated ringers. 1000 milliLiter(s) (75 mL/Hr) IV Continuous <Continuous>  ondansetron Injectable 4 milliGRAM(s) IV Push once    MEDICATIONS  (PRN):  fentaNYL    Injectable 50 MICROGram(s) IV Push every 5 minutes PRN Severe Pain (7 - 10)  ketorolac   Injectable 30 milliGRAM(s) IV Push once PRN Moderate Pain (4 - 6)      Physical Exam:  Constitutional: NAD. Overweight.  Pulmonary: clear to auscultation bilaterally   Cardiovascular: Regular rate and rhythm   Abdomen: soft, non-tender, non-distended, normal bowel sounds  Extremities: no lower extremity edema or calve tenderness, Madiha's sign negative.  Incision: Clean, dry, intact.  Without signs of infection or hernia.    A/P:   Neuro: Pain well controlled. Continue current pain regimen.  CV: History of HTN. Blood pressure well controlled. Continue medication regimen at home.  Pulm: History of AQUILINO. Saturating well on room air. Incentive spirometer use encouraged.  GI: No active disease. Bowel sounds and function normal, tolerating PO diet. Continue current bowel regimen.   : Mane removed, awaiting post op void.  Heme: Hgb 13.9 -> Post op CBC pending.  ID: Afebrile. No antibiotics indicated at this time.   Endo: History of hypothyroidism. Continue medication regimen at home.   FEN: Discontinued IVF.   Skin: No active disease.   Psych: No active disease.   DVT ppx: Ambulation encouraged.  Dispo: Discharge to home, per ASU criteria

## 2022-11-04 NOTE — ASU DISCHARGE PLAN (ADULT/PEDIATRIC) - CARE PROVIDER_API CALL
Bee Hennessy)  Gynecologic Oncology; Obstetrics and Gynecology  [address] [place] [address]  Phone: [phone]  Fax: [phone]  Follow Up Time:

## 2022-11-04 NOTE — BRIEF OPERATIVE NOTE - NSICDXBRIEFPOSTOP_GEN_ALL_CORE_FT
POST-OP DIAGNOSIS:  Postmenopausal bleeding 04-Nov-2022 18:13:06  Patricia Day (endometrial intraepithelial neoplasia) 04-Nov-2022 18:13:15  Patricia Day

## 2022-11-04 NOTE — BRIEF OPERATIVE NOTE - COMMENTS
Patient tolerated procedure well. Mane catheter removed prior to completion of case. Patient tolerated procedure well. Maen catheter removed prior to completion of case.    Dictation Job # [id number]

## 2022-11-04 NOTE — BRIEF OPERATIVE NOTE - NSICDXBRIEFPROCEDURE_GEN_ALL_CORE_FT
PROCEDURES:  Hysterectomy, robot-assisted, laparoscopic, with bilateral salpingo-oophorectomy and staging 04-Nov-2022 18:12:31  Patricia Day

## 2022-11-04 NOTE — ASU PREOP CHECKLIST - SPO2 (%)
to assess pt's speech, language, and cognitive-linguistic abilities. Mild dysarthria noted during BSE as well as a word-finding difficulty episode. SLE requested. 97

## 2022-11-04 NOTE — BRIEF OPERATIVE NOTE - OPERATION/FINDINGS
Normal appearing external genitalia. Normal appearing urethra. On laparoscopy, uterus small, mobile, anteverted. Normal appearing ovaries and fallopian tubes. Appendix normal appearing. No evidence of extrauterine disease.

## 2022-11-04 NOTE — ASU DISCHARGE PLAN (ADULT/PEDIATRIC) - NS MD DC FALL RISK RISK
For information on Fall & Injury Prevention, visit: [email or web address] OR  [email or web address] OR  https://www.cdc.gov/steadi/patient.html

## 2022-11-04 NOTE — ASU DISCHARGE PLAN (ADULT/PEDIATRIC) - ASU DC SPECIAL INSTRUCTIONSFT
A PA will call you in 1 week to follow up on post operative recovery.  Follow up with Dr. Hennessy in office in 2 weeks.

## 2022-11-07 PROBLEM — E66.01 MORBID (SEVERE) OBESITY DUE TO EXCESS CALORIES: Chronic | Status: ACTIVE | Noted: 2022-10-27

## 2022-11-07 PROBLEM — R93.89 ABNORMAL FINDINGS ON DIAGNOSTIC IMAGING OF OTHER SPECIFIED BODY STRUCTURES: Chronic | Status: ACTIVE | Noted: 2022-10-27

## 2022-11-09 LAB — NON-GYNECOLOGICAL CYTOLOGY STUDY: SIGNIFICANT CHANGE UP

## 2022-11-10 ENCOUNTER — NON-APPOINTMENT (OUTPATIENT)
Age: 72
End: 2022-11-10

## 2022-11-18 LAB — SURGICAL PATHOLOGY STUDY: SIGNIFICANT CHANGE UP

## 2022-11-23 ENCOUNTER — APPOINTMENT (OUTPATIENT)
Dept: GYNECOLOGIC ONCOLOGY | Facility: CLINIC | Age: 72
End: 2022-11-23

## 2022-11-29 ENCOUNTER — NON-APPOINTMENT (OUTPATIENT)
Age: 72
End: 2022-11-29

## 2022-11-29 ENCOUNTER — TRANSCRIPTION ENCOUNTER (OUTPATIENT)
Age: 72
End: 2022-11-29

## 2022-11-30 ENCOUNTER — APPOINTMENT (OUTPATIENT)
Dept: GYNECOLOGIC ONCOLOGY | Facility: CLINIC | Age: 72
End: 2022-11-30

## 2022-11-30 VITALS
HEIGHT: 68 IN | SYSTOLIC BLOOD PRESSURE: 130 MMHG | BODY MASS INDEX: 41.98 KG/M2 | TEMPERATURE: 98 F | DIASTOLIC BLOOD PRESSURE: 80 MMHG | WEIGHT: 277 LBS

## 2022-11-30 PROCEDURE — 99024 POSTOP FOLLOW-UP VISIT: CPT

## 2022-12-02 ENCOUNTER — APPOINTMENT (OUTPATIENT)
Dept: RADIATION ONCOLOGY | Facility: CLINIC | Age: 72
End: 2022-12-02

## 2022-12-02 ENCOUNTER — OUTPATIENT (OUTPATIENT)
Dept: OUTPATIENT SERVICES | Facility: HOSPITAL | Age: 72
LOS: 1 days | Discharge: HOME | End: 2022-12-02

## 2022-12-02 VITALS
WEIGHT: 277 LBS | SYSTOLIC BLOOD PRESSURE: 141 MMHG | DIASTOLIC BLOOD PRESSURE: 80 MMHG | OXYGEN SATURATION: 97 % | HEIGHT: 68 IN | HEART RATE: 87 BPM | RESPIRATION RATE: 18 BRPM | TEMPERATURE: 96.98 F | BODY MASS INDEX: 41.98 KG/M2

## 2022-12-02 DIAGNOSIS — I10 ESSENTIAL (PRIMARY) HYPERTENSION: ICD-10-CM

## 2022-12-02 DIAGNOSIS — Z98.890 OTHER SPECIFIED POSTPROCEDURAL STATES: Chronic | ICD-10-CM

## 2022-12-02 DIAGNOSIS — E89.2 POSTPROCEDURAL HYPOPARATHYROIDISM: Chronic | ICD-10-CM

## 2022-12-02 PROCEDURE — 99204 OFFICE O/P NEW MOD 45 MIN: CPT | Mod: 25

## 2022-12-02 NOTE — PHYSICAL EXAM
[Obese] : obese [Sclera] : the sclera and conjunctiva were normal [Outer Ear] : the ears and nose were normal in appearance [] : no respiratory distress [Normal] : oriented to person, place and time, the affect was normal, the mood was normal and not anxious

## 2022-12-07 NOTE — REASON FOR VISIT
[de-identified] : 11/4/2022 [de-identified] : Pelvic EUA, RA TLH, BSO, SLND [de-identified] : The patient is healing remarkably well. Her pain is well controlled. She denies a change in appetite, or a change in weight. She is tolerating PO without nausea or vomiting. She has been ambulating at home without complaints. She has had bowel movements since being home. She denies fever, abdominal pain, vaginal bleeding or discharge, chest pain, shortness of breath, leg pain, change in urinary or bowel habits.

## 2022-12-07 NOTE — REVIEW OF SYSTEMS
[Visual Disturbances] : visual disturbances [SOB on Exertion] : shortness of breath during exertion [Negative] : Allergic/Immunologic [Fever] : no fever [Chills] : no chills [Fatigue] : no fatigue [Recent Change In Weight] : ~T no recent weight change [Hearing Disturbances] : no hearing disturbances [Chest Pain] : no chest pain [Palpitations] : no palpitations [Lower Ext Edema] : no lower extremity edema [Abdominal Pain] : no abdominal pain [Vomiting] : no vomiting [Constipation] : no constipation [Diarrhea] : no diarrhea [Urinary Frequency] : no change in urinary frequency [Vaginal Discharge] : no vaginal discharge [Dysmenorrhea/Abn Vaginal Bleeding] : no dysmenorrhea/abnormal vaginal bleeding [Difficulty Walking] : no difficulty walking

## 2022-12-07 NOTE — HISTORY OF PRESENT ILLNESS
[FreeTextEntry1] : Gianna Louis is a 72 year old womanw/HTN, hypothyroidisms s/p Graves dz Iodine treatment 2007, parathyroidectomy 2008, diagnosed with stage IA grade 2 endometrial cancer s/p RATLH/BSO/sentinel lymph node biopsy and pelvic washings here to discuss adjuvant radiation therapy.\par \par Her oncologic HPI is as follows\par \par She was undergoing an evaluation for thickened endometrium. She underwent D&C, hysteroscopy, polypectomy on 7/27/22 by Dr. Del Toro. Operative findings revealed virginal introitus, uterus sounding to 5cm, extensive intrauterine synechia with irregular mass arising from the 1:00 position. Pathology revealed ECC with mucus and scant benign endocervical epithelium and squamous metaplasia; myosure shavings were benign endocervical polyp, no endometrial tissue identified. Pelvic US on 9/26/22 showed AV uterus 9.2 x 4.6 x 5.9 cm w/ two fibroids. Endometrial thickness measuring 17 mm, cystic spaces within endometrium; Nabothian cysts. Bilateral ovaries WNL. Of note, patient had a pelvic US in 2017 with endometrial thickness of 4-6 mm. \par She was seen by Dr. Hennessy with recommendation for surgery\par 11/4/2022- RA TLH, BSO, SLND. Final pathology demonstrated grade 2 endometrioid carcinoma with MI <50% (0.2/1.7 cm) . No LVSI or involvement of, uterine serosa, cervical stroma, other tissue. Negative margins MMR intact\par \par 12/2/2022:  Patient comes in to discuss adjuvant RT and her post op was uncomplicated.  She feels well overall and denies any issues since surgery.  She feels back to baseline. \par

## 2022-12-07 NOTE — END OF VISIT
[FreeTextEntry3] : This note was written by Azra Kirkland, acting as a scribe for Dr. Bee Hennessy.\par This note accurately reflects the work and decisions made by me.\par

## 2022-12-07 NOTE — ASSESSMENT
[FreeTextEntry1] : The patient is healing well without complication. We discussed ongoing recuperation and reviewed final pathology results in detail - a copy was provided to the patient. We discussed signs and symptoms of cancer recurrence and reviewed recommended surveillance plan (RTO in 3-4 months). I recommend patient consider consulting with Rad/Onc to discuss the option of vaginal brachytherapy to further reduce her risk for recurrence. I am optimistic that patient is lower risk for recurrence given early stage diagnosis & negative LVSI.  Patient also has the option to proceed solely with interval surveillance management. She should schedule apt to discuss RT & weigh her options - Dr. Voss information provided. She understands restrictions to include pelvic rest & no heavy lifting for 6-8 weeks from surgery.  The patient stated and expressed a good understanding of the above information, all of her questions were answered to her apparent satisfaction.

## 2022-12-07 NOTE — DISCUSSION/SUMMARY
[0] : 0 [Erythema] : was not erythematous [Ecchymosis] : was not ecchymotic [Seroma] : had no seroma [Firm] : soft [Incisional Hernia] : no incisional hernia [Mass] : no palpable mass [External Genitalia Abnormal] : normal external genitalia [Vaginal Exam Abnormal] : normal vaginal exam [de-identified] : Cuff healing well with intact sutures  [FreeTextEntry1] : OPERATIVE FINDINGS:Normal-appearing external genitalia, vagina and cervix.  Laparoscopic abdominal survey revealed normal upper abdominal survey, normal appendix.  Small mobile, anteverted  uterus with smooth normal serosa.  Normal ovaries and fallopian tubes.  No extrauterine disease evident.  Frozen section with at least endometrial intraepithelial neoplasia.\par \par PATHOLOGY:\par Final Diagnosis\par 1. Uterus, cervix, bilateral fallopian tubes and bilateral ovaries, total\par hysterectomy with bilateral salpingo-oopherectomy:\par \par -  Endometroid adenocarcinoma, FIGO Grade 2 arising in background of\par complex atypical hyperplasia, see synoptic report\par -  Bilateral parametrium with no metastatic carcinoma\par - Leiomyoma\par - Cervix with tunnel clusters\par -  Right fallopian tube: Paratubal cyst\par - Left fallopian tube: Paratubal cyst\par - Right ovary: Unremarkable\par - Left ovary: Inclusion cyst\par \par 2. Left pelvic sentinel node, excision:\par -  Two lymph nodes, negative for metastatic carcinoma on H T E exam and AE1/\par AE3 immunostain (0/2)\par \par 3. Right pelvic sentinel node, excision:\par - Two lymph nodes, negative for metastatic carcinoma (0/2)

## 2022-12-16 ENCOUNTER — OFFICE (OUTPATIENT)
Dept: URBAN - METROPOLITAN AREA CLINIC 109 | Facility: CLINIC | Age: 72
Setting detail: OPHTHALMOLOGY
End: 2022-12-16
Payer: MEDICARE

## 2022-12-16 DIAGNOSIS — D31.42: ICD-10-CM

## 2022-12-16 DIAGNOSIS — H34.8322: ICD-10-CM

## 2022-12-16 DIAGNOSIS — H25.13: ICD-10-CM

## 2022-12-16 DIAGNOSIS — H52.7: ICD-10-CM

## 2022-12-16 DIAGNOSIS — H02.835: ICD-10-CM

## 2022-12-16 DIAGNOSIS — H02.834: ICD-10-CM

## 2022-12-16 DIAGNOSIS — H02.832: ICD-10-CM

## 2022-12-16 DIAGNOSIS — G43.909: ICD-10-CM

## 2022-12-16 DIAGNOSIS — H40.013: ICD-10-CM

## 2022-12-16 DIAGNOSIS — H35.372: ICD-10-CM

## 2022-12-16 DIAGNOSIS — H02.831: ICD-10-CM

## 2022-12-16 PROCEDURE — 92250 FUNDUS PHOTOGRAPHY W/I&R: CPT | Performed by: OPHTHALMOLOGY

## 2022-12-16 PROCEDURE — 92014 COMPRE OPH EXAM EST PT 1/>: CPT | Performed by: OPHTHALMOLOGY

## 2022-12-16 ASSESSMENT — REFRACTION_MANIFEST
OS_VA1: 20/20
OD_VA1: 20/20
OS_ADD: +2.00
OD_SPHERE: +2.25
OD_ADD: +2.00
OS_SPHERE: +2.00

## 2022-12-16 ASSESSMENT — REFRACTION_CURRENTRX
OD_AXIS: 132
OD_OVR_VA: 20/
OD_ADD: +2.00
OD_CYLINDER: -0.25
OS_SPHERE: +2.00
OS_AXIS: 117
OS_CYLINDER: -0.25
OS_ADD: +2.00
OS_OVR_VA: 20/
OD_SPHERE: +2.25

## 2022-12-16 ASSESSMENT — CONFRONTATIONAL VISUAL FIELD TEST (CVF)
OD_FINDINGS: FULL
OS_FINDINGS: FULL

## 2022-12-16 ASSESSMENT — VISUAL ACUITY
OS_BCVA: 20/20-2
OD_BCVA: 20/20-1

## 2022-12-16 ASSESSMENT — PACHYMETRY
OD_CT_CORRECTION: 0
OS_CT_CORRECTION: 0
OS_CT_UM: 540
OD_CT_UM: 540

## 2022-12-16 ASSESSMENT — LID POSITION - DERMATOCHALASIS
OD_DERMATOCHALASIS: RLL RUL 1+
OS_DERMATOCHALASIS: LLL LUL 1+

## 2022-12-16 ASSESSMENT — TONOMETRY
OS_IOP_MMHG: 15
OD_IOP_MMHG: 15

## 2023-03-29 ENCOUNTER — APPOINTMENT (OUTPATIENT)
Dept: GYNECOLOGIC ONCOLOGY | Facility: CLINIC | Age: 73
End: 2023-03-29
Payer: MEDICARE

## 2023-03-29 VITALS
HEIGHT: 68 IN | WEIGHT: 274 LBS | SYSTOLIC BLOOD PRESSURE: 150 MMHG | DIASTOLIC BLOOD PRESSURE: 70 MMHG | BODY MASS INDEX: 41.52 KG/M2

## 2023-03-29 PROCEDURE — 99212 OFFICE O/P EST SF 10 MIN: CPT

## 2023-04-14 NOTE — ASSESSMENT
[FreeTextEntry1] : 73 year old who has a history of endometrial cancer. The patient is doing well based on today's exam, continue active surveillance. Advised the pt of her option to alternate exam visits between myself & her primary GYN for the duration of the 5 year Gyn/Onc surveillance plan. q4m for 2 years, q6m for the last 3 years.

## 2023-04-14 NOTE — END OF VISIT
[FreeTextEntry3] : Written by Azra Kirkland, acting as a scribe for Dr. Bee Hennessy.\par This note accurately reflects the work and decisions made by me.

## 2023-04-14 NOTE — PHYSICAL EXAM
[Absent] : Adnexa(ae): Absent [Normal] : Recto-Vaginal Exam: Normal [FreeTextEntry1] : Azra Kirkland MA was present the entire time of gynecological exam.  [de-identified] : Laparoscopic scars [de-identified] : No pelvic mass, no RV nodularity

## 2023-04-14 NOTE — REASON FOR VISIT
[FreeTextEntry1] : [place] Location\par \par [facility] [phone] at [address] [place] [id number]\par \par Stage IA Endometrial cancer [G2] - 11/4/22\par \par Active surveillance

## 2023-04-14 NOTE — HISTORY OF PRESENT ILLNESS
[FreeTextEntry1] : 74 y/o patient with newly diagnosed endometrial cancer s/p RA TLH BSO, SLND on 11/4/22, returns to begin interval oncologic surveillance offering no new complaints including no abdominopelvic pain, vaginal or rectal bleeding, chest pain or shortness of breath. Normal bowel and bladder function.\par \par She consulted with Dr. Voss on 12/2 regarding VBT - no adjuvant treatment advised.\par \par \par Health maintenance\par DEXA (6/27/22): Osteopenia\par \par Mammo (9/27/22): BIRADS 1

## 2023-04-26 ENCOUNTER — OFFICE (OUTPATIENT)
Dept: URBAN - METROPOLITAN AREA CLINIC 109 | Facility: CLINIC | Age: 73
Setting detail: OPHTHALMOLOGY
End: 2023-04-26
Payer: MEDICARE

## 2023-04-26 VITALS — HEIGHT: 55 IN

## 2023-04-26 DIAGNOSIS — G43.909: ICD-10-CM

## 2023-04-26 DIAGNOSIS — H02.831: ICD-10-CM

## 2023-04-26 DIAGNOSIS — H40.013: ICD-10-CM

## 2023-04-26 DIAGNOSIS — H02.832: ICD-10-CM

## 2023-04-26 DIAGNOSIS — D31.42: ICD-10-CM

## 2023-04-26 DIAGNOSIS — H34.8322: ICD-10-CM

## 2023-04-26 DIAGNOSIS — H35.372: ICD-10-CM

## 2023-04-26 DIAGNOSIS — H02.834: ICD-10-CM

## 2023-04-26 DIAGNOSIS — H02.835: ICD-10-CM

## 2023-04-26 DIAGNOSIS — H52.7: ICD-10-CM

## 2023-04-26 DIAGNOSIS — H25.13: ICD-10-CM

## 2023-04-26 PROCEDURE — 92020 GONIOSCOPY: CPT | Performed by: OPHTHALMOLOGY

## 2023-04-26 PROCEDURE — 99213 OFFICE O/P EST LOW 20 MIN: CPT | Performed by: OPHTHALMOLOGY

## 2023-04-26 PROCEDURE — 92083 EXTENDED VISUAL FIELD XM: CPT | Performed by: OPHTHALMOLOGY

## 2023-04-26 ASSESSMENT — LID POSITION - DERMATOCHALASIS
OS_DERMATOCHALASIS: LLL LUL 1+
OD_DERMATOCHALASIS: RLL RUL 1+

## 2023-04-26 ASSESSMENT — TONOMETRY
OS_IOP_MMHG: 21
OD_IOP_MMHG: 17

## 2023-04-26 ASSESSMENT — REFRACTION_CURRENTRX
OD_ADD: +2.00
OS_ADD: +2.00
OD_AXIS: 132
OS_SPHERE: +2.00
OS_AXIS: 117
OD_SPHERE: +2.25
OS_CYLINDER: -0.25
OS_OVR_VA: 20/
OD_CYLINDER: -0.25
OD_OVR_VA: 20/

## 2023-04-26 ASSESSMENT — VISUAL ACUITY
OS_BCVA: 20/20-2
OD_BCVA: 20/20-2

## 2023-04-26 ASSESSMENT — REFRACTION_MANIFEST
OD_ADD: +2.00
OD_SPHERE: +2.25
OS_VA1: 20/20
OS_SPHERE: +2.00
OD_VA1: 20/20
OS_ADD: +2.00

## 2023-04-26 ASSESSMENT — PACHYMETRY
OD_CT_UM: 540
OS_CT_UM: 540
OD_CT_CORRECTION: 0
OS_CT_CORRECTION: 0

## 2023-04-26 ASSESSMENT — CONFRONTATIONAL VISUAL FIELD TEST (CVF)
OD_FINDINGS: FULL
OS_FINDINGS: FULL

## 2023-05-09 NOTE — ASU PREOP CHECKLIST - HEIGHT IN FEET
Quality 226: Preventive Care And Screening: Tobacco Use: Screening And Cessation Intervention: Patient screened for tobacco use and is an ex/non-smoker Quality 130: Documentation Of Current Medications In The Medical Record: Current Medications Documented Quality 431: Preventive Care And Screening: Unhealthy Alcohol Use - Screening: Patient not identified as an unhealthy alcohol user when screened for unhealthy alcohol use using a systematic screening method Detail Level: Generalized 5

## 2023-07-19 ENCOUNTER — APPOINTMENT (OUTPATIENT)
Dept: GYNECOLOGIC ONCOLOGY | Facility: CLINIC | Age: 73
End: 2023-07-19
Payer: MEDICARE

## 2023-07-19 ENCOUNTER — NON-APPOINTMENT (OUTPATIENT)
Age: 73
End: 2023-07-19

## 2023-07-19 VITALS
BODY MASS INDEX: 41.68 KG/M2 | OXYGEN SATURATION: 93 % | HEART RATE: 67 BPM | WEIGHT: 275 LBS | SYSTOLIC BLOOD PRESSURE: 122 MMHG | DIASTOLIC BLOOD PRESSURE: 86 MMHG | HEIGHT: 68 IN

## 2023-07-19 PROCEDURE — 99212 OFFICE O/P EST SF 10 MIN: CPT

## 2023-07-28 NOTE — REASON FOR VISIT
[FreeTextEntry1] : [place] Location\par \par [facility] [phone] at [address] [place] [id number]\par \par Stage IA Endometrial cancer [G2] - 11/4/22\par \par Active surveillance - breast exam if she doesn’t see GYN

## 2023-07-28 NOTE — END OF VISIT
[FreeTextEntry3] : Written by Cha Arzate, acting as a scribe for Dr. Bee Leyva This note accurately reflects the work and decisions made by me.

## 2023-07-28 NOTE — PHYSICAL EXAM
[Chaperone Present] : A chaperone was present in the examining room during all aspects of the physical examination [Absent] : Adnexa(ae): Absent [Normal] : Recto-Vaginal Exam: Normal [FreeTextEntry1] : Cha Arzate Medical assistant chaperoned during gynecologic exam.  [de-identified] : No rectovaginal nodularity or pelvic mass.

## 2023-07-28 NOTE — HISTORY OF PRESENT ILLNESS
[FreeTextEntry1] : 73 year old returns for interval oncologic surveillance offering no new complaints including no abdominopelvic pain, vaginal or rectal bleeding, chest pain or shortness of breath. Normal bowel and bladder function.\par \par She consulted with Dr. Voss on 12/2 regarding VBT - no adjuvant treatment advised.\par \par \par Health maintenance\par DEXA (6/27/22): Osteopenia\par \par Mammo (9/27/22): BIRADS 1

## 2023-07-28 NOTE — ASSESSMENT
[FreeTextEntry1] : 73 year old who has a history of endometrial cancer. The patient is doing well based on today's exam, clinically ROMELIA x  7 months.  Patient will see her primary gynecologist in 4 months for a surveillance visit and will see me n 8 months for routine svl visit. \par \par \par

## 2023-08-30 ENCOUNTER — OFFICE (OUTPATIENT)
Dept: URBAN - METROPOLITAN AREA CLINIC 109 | Facility: CLINIC | Age: 73
Setting detail: OPHTHALMOLOGY
End: 2023-08-30
Payer: MEDICARE

## 2023-08-30 VITALS — HEIGHT: 55 IN

## 2023-08-30 DIAGNOSIS — H02.832: ICD-10-CM

## 2023-08-30 DIAGNOSIS — G43.909: ICD-10-CM

## 2023-08-30 DIAGNOSIS — H25.13: ICD-10-CM

## 2023-08-30 DIAGNOSIS — H02.831: ICD-10-CM

## 2023-08-30 DIAGNOSIS — H02.835: ICD-10-CM

## 2023-08-30 DIAGNOSIS — H40.013: ICD-10-CM

## 2023-08-30 DIAGNOSIS — H34.8322: ICD-10-CM

## 2023-08-30 DIAGNOSIS — H35.372: ICD-10-CM

## 2023-08-30 DIAGNOSIS — H02.834: ICD-10-CM

## 2023-08-30 DIAGNOSIS — D31.42: ICD-10-CM

## 2023-08-30 PROCEDURE — 92133 CPTRZD OPH DX IMG PST SGM ON: CPT | Performed by: OPHTHALMOLOGY

## 2023-08-30 PROCEDURE — 92014 COMPRE OPH EXAM EST PT 1/>: CPT | Performed by: OPHTHALMOLOGY

## 2023-08-30 ASSESSMENT — REFRACTION_MANIFEST
OS_VA1: 20/20
OD_VA1: 20/20
OD_ADD: +2.00
OD_SPHERE: +2.25
OS_SPHERE: +2.00
OS_ADD: +2.00

## 2023-08-30 ASSESSMENT — PACHYMETRY
OD_CT_CORRECTION: 0
OS_CT_UM: 540
OD_CT_UM: 540
OS_CT_CORRECTION: 0

## 2023-08-30 ASSESSMENT — VISUAL ACUITY
OD_BCVA: 20/25-1
OS_BCVA: 20/20-2

## 2023-08-30 ASSESSMENT — REFRACTION_CURRENTRX
OS_CYLINDER: -0.25
OD_SPHERE: +2.25
OS_OVR_VA: 20/
OS_ADD: +2.00
OD_ADD: +2.00
OD_CYLINDER: -0.25
OD_AXIS: 132
OD_OVR_VA: 20/
OS_AXIS: 117
OS_SPHERE: +2.00

## 2023-08-30 ASSESSMENT — LID POSITION - DERMATOCHALASIS
OS_DERMATOCHALASIS: LLL LUL 1+
OD_DERMATOCHALASIS: RLL RUL 1+

## 2023-08-30 ASSESSMENT — TONOMETRY: OD_IOP_MMHG: 19

## 2023-08-30 ASSESSMENT — CONFRONTATIONAL VISUAL FIELD TEST (CVF)
OD_FINDINGS: FULL
OS_FINDINGS: FULL

## 2023-12-06 ENCOUNTER — OFFICE (OUTPATIENT)
Dept: URBAN - METROPOLITAN AREA CLINIC 109 | Facility: CLINIC | Age: 73
Setting detail: OPHTHALMOLOGY
End: 2023-12-06
Payer: MEDICARE

## 2023-12-06 VITALS — HEIGHT: 55 IN

## 2023-12-06 DIAGNOSIS — H02.832: ICD-10-CM

## 2023-12-06 DIAGNOSIS — H25.13: ICD-10-CM

## 2023-12-06 DIAGNOSIS — H02.831: ICD-10-CM

## 2023-12-06 DIAGNOSIS — H02.835: ICD-10-CM

## 2023-12-06 DIAGNOSIS — H52.7: ICD-10-CM

## 2023-12-06 DIAGNOSIS — H02.834: ICD-10-CM

## 2023-12-06 DIAGNOSIS — G43.909: ICD-10-CM

## 2023-12-06 DIAGNOSIS — H35.372: ICD-10-CM

## 2023-12-06 DIAGNOSIS — D31.42: ICD-10-CM

## 2023-12-06 DIAGNOSIS — H40.013: ICD-10-CM

## 2023-12-06 DIAGNOSIS — H34.8322: ICD-10-CM

## 2023-12-06 PROCEDURE — 92250 FUNDUS PHOTOGRAPHY W/I&R: CPT | Performed by: OPHTHALMOLOGY

## 2023-12-06 PROCEDURE — 92014 COMPRE OPH EXAM EST PT 1/>: CPT | Performed by: OPHTHALMOLOGY

## 2023-12-06 ASSESSMENT — REFRACTION_CURRENTRX
OD_OVR_VA: 20/
OS_OVR_VA: 20/
OS_SPHERE: +2.00
OS_AXIS: 117
OS_ADD: +2.00
OD_AXIS: 132
OD_CYLINDER: -0.25
OD_SPHERE: +2.25
OD_ADD: +2.00
OS_CYLINDER: -0.25

## 2023-12-06 ASSESSMENT — REFRACTION_MANIFEST
OD_ADD: +2.00
OS_VA1: 20/40-2
OS_SPHERE: +2.00
OD_SPHERE: +2.25
OD_VA1: 20/20
OS_ADD: +2.00

## 2023-12-06 ASSESSMENT — CONFRONTATIONAL VISUAL FIELD TEST (CVF)
OS_FINDINGS: FULL
OD_FINDINGS: FULL

## 2023-12-06 ASSESSMENT — REFRACTION_AUTOREFRACTION
OS_AXIS: 71
OS_CYLINDER: -0.25
OD_AXIS: 105
OS_SPHERE: +2.75
OD_SPHERE: +3.25
OD_CYLINDER: -0.75

## 2023-12-06 ASSESSMENT — SPHEQUIV_DERIVED
OD_SPHEQUIV: 2.875
OS_SPHEQUIV: 2.625

## 2023-12-06 ASSESSMENT — LID POSITION - DERMATOCHALASIS
OS_DERMATOCHALASIS: LLL LUL 1+
OD_DERMATOCHALASIS: RLL RUL 1+

## 2023-12-06 NOTE — H&P PST ADULT - NSICDXPASTSURGICALHX_GEN_ALL_CORE_FT
General PAST SURGICAL HISTORY:  History of dilation and curettage     S/P colonoscopy     S/P subtotal parathyroidectomy (2010)

## 2023-12-08 ENCOUNTER — OFFICE (OUTPATIENT)
Dept: URBAN - METROPOLITAN AREA CLINIC 32 | Facility: CLINIC | Age: 73
Setting detail: OPHTHALMOLOGY
End: 2023-12-08
Payer: MEDICARE

## 2023-12-08 DIAGNOSIS — H35.372: ICD-10-CM

## 2023-12-08 DIAGNOSIS — H34.8322: ICD-10-CM

## 2023-12-08 PROCEDURE — 92134 CPTRZ OPH DX IMG PST SGM RTA: CPT | Performed by: OPHTHALMOLOGY

## 2023-12-08 PROCEDURE — 99213 OFFICE O/P EST LOW 20 MIN: CPT | Performed by: OPHTHALMOLOGY

## 2023-12-08 ASSESSMENT — LID POSITION - DERMATOCHALASIS
OS_DERMATOCHALASIS: LLL LUL 1+
OD_DERMATOCHALASIS: RLL RUL 1+

## 2023-12-08 ASSESSMENT — REFRACTION_AUTOREFRACTION
OD_CYLINDER: -0.75
OS_CYLINDER: -0.25
OS_AXIS: 71
OD_SPHERE: +3.25
OD_AXIS: 105
OS_SPHERE: +2.75

## 2023-12-08 ASSESSMENT — CONFRONTATIONAL VISUAL FIELD TEST (CVF)
OD_FINDINGS: FULL
OS_FINDINGS: FULL

## 2023-12-08 ASSESSMENT — SPHEQUIV_DERIVED
OS_SPHEQUIV: 2.625
OD_SPHEQUIV: 2.875

## 2024-02-09 ENCOUNTER — OFFICE (OUTPATIENT)
Dept: URBAN - METROPOLITAN AREA CLINIC 32 | Facility: CLINIC | Age: 74
Setting detail: OPHTHALMOLOGY
End: 2024-02-09
Payer: MEDICARE

## 2024-02-09 DIAGNOSIS — H35.372: ICD-10-CM

## 2024-02-09 DIAGNOSIS — H34.8322: ICD-10-CM

## 2024-02-09 PROCEDURE — 92134 CPTRZ OPH DX IMG PST SGM RTA: CPT | Performed by: OPHTHALMOLOGY

## 2024-02-09 PROCEDURE — 92012 INTRM OPH EXAM EST PATIENT: CPT | Performed by: OPHTHALMOLOGY

## 2024-02-09 ASSESSMENT — REFRACTION_AUTOREFRACTION
OS_AXIS: 71
OD_SPHERE: +3.25
OD_CYLINDER: -0.75
OS_SPHERE: +2.75
OD_AXIS: 105
OS_CYLINDER: -0.25

## 2024-02-09 ASSESSMENT — SPHEQUIV_DERIVED
OS_SPHEQUIV: 2.625
OD_SPHEQUIV: 2.875

## 2024-02-09 ASSESSMENT — LID POSITION - DERMATOCHALASIS
OD_DERMATOCHALASIS: RLL RUL 1+
OS_DERMATOCHALASIS: LLL LUL 1+

## 2024-03-06 ENCOUNTER — OFFICE (OUTPATIENT)
Dept: URBAN - METROPOLITAN AREA CLINIC 109 | Facility: CLINIC | Age: 74
Setting detail: OPHTHALMOLOGY
End: 2024-03-06
Payer: MEDICARE

## 2024-03-06 VITALS — HEIGHT: 55 IN

## 2024-03-06 DIAGNOSIS — D31.42: ICD-10-CM

## 2024-03-06 DIAGNOSIS — H02.831: ICD-10-CM

## 2024-03-06 DIAGNOSIS — H34.8322: ICD-10-CM

## 2024-03-06 DIAGNOSIS — H02.832: ICD-10-CM

## 2024-03-06 DIAGNOSIS — G43.909: ICD-10-CM

## 2024-03-06 DIAGNOSIS — H35.372: ICD-10-CM

## 2024-03-06 DIAGNOSIS — H25.13: ICD-10-CM

## 2024-03-06 DIAGNOSIS — H52.7: ICD-10-CM

## 2024-03-06 DIAGNOSIS — H02.835: ICD-10-CM

## 2024-03-06 DIAGNOSIS — H40.013: ICD-10-CM

## 2024-03-06 DIAGNOSIS — H02.834: ICD-10-CM

## 2024-03-06 PROCEDURE — 99213 OFFICE O/P EST LOW 20 MIN: CPT | Performed by: OPHTHALMOLOGY

## 2024-03-06 PROCEDURE — 92083 EXTENDED VISUAL FIELD XM: CPT | Performed by: OPHTHALMOLOGY

## 2024-03-06 ASSESSMENT — LID POSITION - DERMATOCHALASIS
OS_DERMATOCHALASIS: LLL LUL 1+
OD_DERMATOCHALASIS: RLL RUL 1+

## 2024-04-03 ENCOUNTER — APPOINTMENT (OUTPATIENT)
Dept: GYNECOLOGIC ONCOLOGY | Facility: CLINIC | Age: 74
End: 2024-04-03
Payer: MEDICARE

## 2024-04-03 VITALS
HEART RATE: 81 BPM | BODY MASS INDEX: 41.37 KG/M2 | SYSTOLIC BLOOD PRESSURE: 120 MMHG | DIASTOLIC BLOOD PRESSURE: 84 MMHG | RESPIRATION RATE: 16 BRPM | WEIGHT: 273 LBS | HEIGHT: 68 IN | OXYGEN SATURATION: 94 %

## 2024-04-03 DIAGNOSIS — C54.1 MALIGNANT NEOPLASM OF ENDOMETRIUM: ICD-10-CM

## 2024-04-03 PROCEDURE — 99212 OFFICE O/P EST SF 10 MIN: CPT

## 2024-04-03 PROCEDURE — G2211 COMPLEX E/M VISIT ADD ON: CPT

## 2024-04-03 PROCEDURE — 99459 PELVIC EXAMINATION: CPT

## 2024-04-21 NOTE — PHYSICAL EXAM
[Absent] : Adnexa(ae): Absent [Normal] : Recto-Vaginal Exam: Normal [FreeTextEntry1] : Azra Kirkland MA was present the entire time of gynecological exam.  [de-identified] : No RV nodularity, no pelvic masses

## 2024-04-21 NOTE — HISTORY OF PRESENT ILLNESS
[FreeTextEntry1] : 74 year old returns for interval oncologic surveillance offering no new complaints including no abdominopelvic pain, vaginal or rectal bleeding, chest pain or shortness of breath. Normal bowel and bladder function.  Pt continues f/u with primary GYN/Dr. Zamora, last seen Dec/Jan.   Ophthalmologist: q3m for pre-glaucoma   Health maintenance: DEXA (6/27/22): Osteopenia Mammo (10/4/23): BR1, benign

## 2024-04-21 NOTE — ASSESSMENT
[FreeTextEntry1] : 74 year old who has a history of endometrial cancer. The patient is doing well based on today's exam, clinically ROMELIA x 2.5 years. Given her low grade diagnosis & 2-year milestone, patient may see PA in my office for interval exam which is reasonable to extend q6m at this time.

## 2024-04-21 NOTE — REVIEW OF SYSTEMS
[Negative] : Gastrointestinal [Vaginal Discharge] : no vaginal discharge [Abn Vag Bleeding] : no abnormal vaginal bleeding

## 2024-04-21 NOTE — END OF VISIT
[FreeTextEntry3] : Written by Azra Kirkland, acting as a scribe for Dr. Bee Hennessy. This note accurately reflects the work and decisions made by me.

## 2024-04-21 NOTE — REASON FOR VISIT
[FreeTextEntry1] : [facility] [phone] at [address] [place] [address]  Stage IA Endometrial cancer [G2] - 11/4/22  Active surveillance

## 2024-06-12 ENCOUNTER — OFFICE (OUTPATIENT)
Dept: URBAN - METROPOLITAN AREA CLINIC 109 | Facility: CLINIC | Age: 74
Setting detail: OPHTHALMOLOGY
End: 2024-06-12
Payer: MEDICARE

## 2024-06-12 DIAGNOSIS — H02.831: ICD-10-CM

## 2024-06-12 DIAGNOSIS — H02.835: ICD-10-CM

## 2024-06-12 DIAGNOSIS — H40.013: ICD-10-CM

## 2024-06-12 DIAGNOSIS — D31.42: ICD-10-CM

## 2024-06-12 DIAGNOSIS — H02.834: ICD-10-CM

## 2024-06-12 DIAGNOSIS — H25.13: ICD-10-CM

## 2024-06-12 DIAGNOSIS — H02.832: ICD-10-CM

## 2024-06-12 DIAGNOSIS — H34.8322: ICD-10-CM

## 2024-06-12 DIAGNOSIS — E05.00: ICD-10-CM

## 2024-06-12 DIAGNOSIS — G43.909: ICD-10-CM

## 2024-06-12 DIAGNOSIS — H35.372: ICD-10-CM

## 2024-06-12 PROCEDURE — 92014 COMPRE OPH EXAM EST PT 1/>: CPT | Performed by: OPHTHALMOLOGY

## 2024-06-12 PROCEDURE — 92020 GONIOSCOPY: CPT | Performed by: OPHTHALMOLOGY

## 2024-06-12 PROCEDURE — 92133 CPTRZD OPH DX IMG PST SGM ON: CPT | Performed by: OPHTHALMOLOGY

## 2024-06-12 ASSESSMENT — CONFRONTATIONAL VISUAL FIELD TEST (CVF)
OD_FINDINGS: FULL
OS_FINDINGS: FULL

## 2024-06-12 ASSESSMENT — LID POSITION - DERMATOCHALASIS
OS_DERMATOCHALASIS: LLL LUL 1+
OD_DERMATOCHALASIS: RLL RUL 1+

## 2024-08-07 ENCOUNTER — APPOINTMENT (OUTPATIENT)
Dept: GYNECOLOGIC ONCOLOGY | Facility: CLINIC | Age: 74
End: 2024-08-07

## 2024-08-07 PROBLEM — B37.2 CANDIDIASIS, INTERTRIGO: Status: ACTIVE | Noted: 2024-08-07

## 2024-08-07 PROCEDURE — 99459 PELVIC EXAMINATION: CPT

## 2024-08-07 PROCEDURE — G2211 COMPLEX E/M VISIT ADD ON: CPT

## 2024-08-07 PROCEDURE — 99213 OFFICE O/P EST LOW 20 MIN: CPT

## 2024-08-07 NOTE — HISTORY OF PRESENT ILLNESS
[FreeTextEntry1] : 74 year old returns for interval oncologic surveillance offering no new complaints including no abdominopelvic pain, vaginal or rectal bleeding, chest pain or shortness of breath. Normal bowel and bladder function.  Pt continues f/u with primary GYN/Dr. Zamora, last seen Dec/Jan.  Ophthalmologist: q3m for pre-glaucoma   Health maintenance: DEXA (6/27/22): Osteopenia Mammo (10/4/23): BR1, benign [id number] polyps removed

## 2024-08-07 NOTE — PHYSICAL EXAM
[Chaperone Present] : A chaperone was present in the examining room during all aspects of the physical examination [38183] : A chaperone was present during the pelvic exam. [FreeTextEntry2] : Cha Arzate was present as chaperone during examination. [Absent] : Adnexa(ae): Absent [Normal] : Recto-Vaginal Exam: Normal [de-identified] : erythema at lower abdominal fold c/w candida  [de-identified] : no RV or CDS nodularity  [Restricted in physically strenuous activity but ambulatory and able to carry out work of a light or sedentary nature] : Status 1- Restricted in physically strenuous activity but ambulatory and able to carry out work of a light or sedentary nature, e.g., light house work, office work

## 2024-08-07 NOTE — PHYSICAL EXAM
[Chaperone Present] : A chaperone was present in the examining room during all aspects of the physical examination [30510] : A chaperone was present during the pelvic exam. [FreeTextEntry2] : Cha Arzate was present as chaperone during examination. [Absent] : Adnexa(ae): Absent [Normal] : Recto-Vaginal Exam: Normal [de-identified] : erythema at lower abdominal fold c/w candida  [de-identified] : no RV or CDS nodularity  [Restricted in physically strenuous activity but ambulatory and able to carry out work of a light or sedentary nature] : Status 1- Restricted in physically strenuous activity but ambulatory and able to carry out work of a light or sedentary nature, e.g., light house work, office work

## 2024-08-07 NOTE — REASON FOR VISIT
[FreeTextEntry1] : [facility]  [address] [place] [phone]  Stage IA Endometrial cancer [G2] - 11/4/22 s/p TRH, BSO, SLND, PW  Active surveillance

## 2024-08-07 NOTE — PLAN
[TextEntry] : RTO in 4 months or sooner with any issues DEXA scheduled  Screening mammogram to be done in October -PCP ordered Routine follow up with PCP/GYN Imaging as clinically indicated

## 2024-08-07 NOTE — ASSESSMENT
[FreeTextEntry1] : 73yo female with Stage IA Endometrial cancer [G2]  since 11/4/22 s/p TRH, BSO, SLND, PW with no adjuvant therapy needed.   Pt is clinically ROMELIA on exam today.   She will schedule her mammogram and DEXA.

## 2024-08-07 NOTE — ASSESSMENT
[FreeTextEntry1] : 75yo female with Stage IA Endometrial cancer [G2]  since 11/4/22 s/p TRH, BSO, SLND, PW with no adjuvant therapy needed.   Pt is clinically ROMELIA on exam today.   She will schedule her mammogram and DEXA.

## 2024-08-07 NOTE — HISTORY OF PRESENT ILLNESS
[FreeTextEntry1] : 74 year old returns for interval oncologic surveillance offering no new complaints including no abdominopelvic pain, vaginal or rectal bleeding, chest pain or shortness of breath. Normal bowel and bladder function.  Pt continues f/u with primary GYN/Dr. Zamora, last seen Dec/Jan.  Ophthalmologist: q3m for pre-glaucoma   Health maintenance: DEXA (6/27/22): Osteopenia Mammo (10/4/23): BR1, benign [id number] polyps removed

## 2024-08-07 NOTE — REASON FOR VISIT
[FreeTextEntry1] : [facility]  [address] [place] [id number]  Stage IA Endometrial cancer [G2] - 11/4/22 s/p TRH, BSO, SLND, PW  Active surveillance

## 2024-09-11 ENCOUNTER — OFFICE (OUTPATIENT)
Dept: URBAN - METROPOLITAN AREA CLINIC 109 | Facility: CLINIC | Age: 74
Setting detail: OPHTHALMOLOGY
End: 2024-09-11
Payer: MEDICARE

## 2024-09-11 DIAGNOSIS — H02.834: ICD-10-CM

## 2024-09-11 DIAGNOSIS — G43.909: ICD-10-CM

## 2024-09-11 DIAGNOSIS — H35.372: ICD-10-CM

## 2024-09-11 DIAGNOSIS — H02.831: ICD-10-CM

## 2024-09-11 DIAGNOSIS — H25.13: ICD-10-CM

## 2024-09-11 DIAGNOSIS — H02.835: ICD-10-CM

## 2024-09-11 DIAGNOSIS — E05.00: ICD-10-CM

## 2024-09-11 DIAGNOSIS — H02.832: ICD-10-CM

## 2024-09-11 DIAGNOSIS — H34.8322: ICD-10-CM

## 2024-09-11 DIAGNOSIS — D31.42: ICD-10-CM

## 2024-09-11 DIAGNOSIS — H40.013: ICD-10-CM

## 2024-09-11 PROCEDURE — 92014 COMPRE OPH EXAM EST PT 1/>: CPT | Performed by: OPHTHALMOLOGY

## 2024-09-11 PROCEDURE — 92134 CPTRZ OPH DX IMG PST SGM RTA: CPT | Performed by: OPHTHALMOLOGY

## 2024-09-11 ASSESSMENT — CONFRONTATIONAL VISUAL FIELD TEST (CVF)
OS_FINDINGS: FULL
OD_FINDINGS: FULL

## 2024-09-11 ASSESSMENT — LID POSITION - DERMATOCHALASIS
OS_DERMATOCHALASIS: LLL LUL 1+
OD_DERMATOCHALASIS: RLL RUL 1+

## 2024-12-04 ENCOUNTER — APPOINTMENT (OUTPATIENT)
Dept: GYNECOLOGIC ONCOLOGY | Facility: CLINIC | Age: 74
End: 2024-12-04

## 2024-12-04 VITALS
HEIGHT: 68 IN | OXYGEN SATURATION: 94 % | DIASTOLIC BLOOD PRESSURE: 72 MMHG | WEIGHT: 275 LBS | BODY MASS INDEX: 41.68 KG/M2 | SYSTOLIC BLOOD PRESSURE: 134 MMHG | RESPIRATION RATE: 16 BRPM | HEART RATE: 83 BPM

## 2024-12-04 DIAGNOSIS — C54.1 MALIGNANT NEOPLASM OF ENDOMETRIUM: ICD-10-CM

## 2024-12-04 PROCEDURE — 99459 PELVIC EXAMINATION: CPT

## 2024-12-04 PROCEDURE — 99212 OFFICE O/P EST SF 10 MIN: CPT

## 2024-12-10 ENCOUNTER — OFFICE (OUTPATIENT)
Dept: URBAN - METROPOLITAN AREA CLINIC 109 | Facility: CLINIC | Age: 74
Setting detail: OPHTHALMOLOGY
End: 2024-12-10
Payer: MEDICARE

## 2024-12-10 DIAGNOSIS — H02.831: ICD-10-CM

## 2024-12-10 DIAGNOSIS — H35.372: ICD-10-CM

## 2024-12-10 DIAGNOSIS — G43.909: ICD-10-CM

## 2024-12-10 DIAGNOSIS — H02.832: ICD-10-CM

## 2024-12-10 DIAGNOSIS — H25.13: ICD-10-CM

## 2024-12-10 DIAGNOSIS — H34.8322: ICD-10-CM

## 2024-12-10 DIAGNOSIS — H40.013: ICD-10-CM

## 2024-12-10 DIAGNOSIS — H02.834: ICD-10-CM

## 2024-12-10 DIAGNOSIS — H02.835: ICD-10-CM

## 2024-12-10 DIAGNOSIS — D31.42: ICD-10-CM

## 2024-12-10 DIAGNOSIS — E05.00: ICD-10-CM

## 2024-12-10 PROCEDURE — 99213 OFFICE O/P EST LOW 20 MIN: CPT | Performed by: OPHTHALMOLOGY

## 2024-12-10 PROCEDURE — 92250 FUNDUS PHOTOGRAPHY W/I&R: CPT | Performed by: OPHTHALMOLOGY

## 2024-12-10 ASSESSMENT — REFRACTION_AUTOREFRACTION
OS_AXIS: 94
OD_SPHERE: +3.00
OD_AXIS: 103
OD_CYLINDER: -0.50
OS_SPHERE: +2.50
OS_CYLINDER: -0.25

## 2024-12-10 ASSESSMENT — TONOMETRY: OS_IOP_MMHG: 17

## 2024-12-10 ASSESSMENT — PACHYMETRY
OS_CT_CORRECTION: 0
OS_CT_UM: 540
OD_CT_UM: 540
OD_CT_CORRECTION: 0

## 2024-12-10 ASSESSMENT — LID POSITION - DERMATOCHALASIS
OD_DERMATOCHALASIS: RLL RUL 1+
OS_DERMATOCHALASIS: LLL LUL 1+

## 2024-12-10 ASSESSMENT — CONFRONTATIONAL VISUAL FIELD TEST (CVF)
OD_FINDINGS: FULL
OS_FINDINGS: FULL

## 2024-12-10 ASSESSMENT — VISUAL ACUITY
OD_BCVA: 20/30-2
OS_BCVA: 20/30+3

## 2025-02-17 NOTE — ASU DISCHARGE PLAN (ADULT/PEDIATRIC) - CARE COORDINATION DISCHARGE PLANNING
[facility] provides services at a reduced cost to those who are determined to be eligible through [facility]’s financial assistance program. Information regarding [facility]’s financial assistance program can be found by going to [email or web address] or by calling [phone]. No

## 2025-03-11 ENCOUNTER — OFFICE (OUTPATIENT)
Dept: URBAN - METROPOLITAN AREA CLINIC 109 | Facility: CLINIC | Age: 75
Setting detail: OPHTHALMOLOGY
End: 2025-03-11
Payer: MEDICARE

## 2025-03-11 DIAGNOSIS — H25.13: ICD-10-CM

## 2025-03-11 DIAGNOSIS — H02.832: ICD-10-CM

## 2025-03-11 DIAGNOSIS — H02.834: ICD-10-CM

## 2025-03-11 DIAGNOSIS — D31.42: ICD-10-CM

## 2025-03-11 DIAGNOSIS — G43.909: ICD-10-CM

## 2025-03-11 DIAGNOSIS — H35.372: ICD-10-CM

## 2025-03-11 DIAGNOSIS — E05.00: ICD-10-CM

## 2025-03-11 DIAGNOSIS — H02.831: ICD-10-CM

## 2025-03-11 DIAGNOSIS — H34.8322: ICD-10-CM

## 2025-03-11 DIAGNOSIS — H40.013: ICD-10-CM

## 2025-03-11 DIAGNOSIS — H02.835: ICD-10-CM

## 2025-03-11 PROCEDURE — 92083 EXTENDED VISUAL FIELD XM: CPT | Performed by: OPHTHALMOLOGY

## 2025-03-11 PROCEDURE — 99213 OFFICE O/P EST LOW 20 MIN: CPT | Performed by: OPHTHALMOLOGY

## 2025-03-11 PROCEDURE — 92134 CPTRZ OPH DX IMG PST SGM RTA: CPT | Performed by: OPHTHALMOLOGY

## 2025-03-11 ASSESSMENT — PACHYMETRY
OD_CT_CORRECTION: 0
OD_CT_UM: 540
OS_CT_CORRECTION: 0
OS_CT_UM: 540

## 2025-03-11 ASSESSMENT — TONOMETRY
OD_IOP_MMHG: 15
OS_IOP_MMHG: 21
OD_IOP_MMHG: 17
OS_IOP_MMHG: 16

## 2025-03-11 ASSESSMENT — LID POSITION - DERMATOCHALASIS
OS_DERMATOCHALASIS: LLL LUL 1+
OD_DERMATOCHALASIS: RLL RUL 1+

## 2025-03-11 ASSESSMENT — CONFRONTATIONAL VISUAL FIELD TEST (CVF)
OD_FINDINGS: FULL
OS_FINDINGS: FULL

## 2025-03-11 ASSESSMENT — VISUAL ACUITY
OD_BCVA: 20/25-2
OS_BCVA: 20/25

## 2025-03-11 ASSESSMENT — REFRACTION_AUTOREFRACTION
OD_AXIS: 099
OD_CYLINDER: -0.75
OD_SPHERE: +3.00
OS_SPHERE: +2.00

## 2025-03-17 ENCOUNTER — OFFICE (OUTPATIENT)
Dept: URBAN - METROPOLITAN AREA CLINIC 115 | Facility: CLINIC | Age: 75
Setting detail: OPHTHALMOLOGY
End: 2025-03-17
Payer: MEDICARE

## 2025-03-17 DIAGNOSIS — H34.8322: ICD-10-CM

## 2025-03-17 DIAGNOSIS — H35.372: ICD-10-CM

## 2025-03-17 PROBLEM — H02.834 DERMATOCHALASIS; RIGHT UPPER LID, RIGHT LOWER LID, LEFT UPPER LID, LEFT LOWER LID: Status: ACTIVE | Noted: 2025-03-11

## 2025-03-17 PROBLEM — H02.831 DERMATOCHALASIS; RIGHT UPPER LID, RIGHT LOWER LID, LEFT UPPER LID, LEFT LOWER LID: Status: ACTIVE | Noted: 2025-03-11

## 2025-03-17 PROBLEM — H02.835 DERMATOCHALASIS; RIGHT UPPER LID, RIGHT LOWER LID, LEFT UPPER LID, LEFT LOWER LID: Status: ACTIVE | Noted: 2025-03-11

## 2025-03-17 PROBLEM — H02.832 DERMATOCHALASIS; RIGHT UPPER LID, RIGHT LOWER LID, LEFT UPPER LID, LEFT LOWER LID: Status: ACTIVE | Noted: 2025-03-11

## 2025-03-17 PROCEDURE — 92014 COMPRE OPH EXAM EST PT 1/>: CPT | Performed by: OPHTHALMOLOGY

## 2025-03-17 PROCEDURE — 92134 CPTRZ OPH DX IMG PST SGM RTA: CPT | Performed by: OPHTHALMOLOGY

## 2025-03-17 ASSESSMENT — CONFRONTATIONAL VISUAL FIELD TEST (CVF)
OD_FINDINGS: FULL
OS_FINDINGS: FULL

## 2025-03-17 ASSESSMENT — VISUAL ACUITY
OD_BCVA: 20/30
OS_BCVA: 20/25

## 2025-03-17 ASSESSMENT — PACHYMETRY
OS_CT_UM: 540
OD_CT_UM: 540
OS_CT_CORRECTION: 0
OD_CT_CORRECTION: 0

## 2025-03-17 ASSESSMENT — LID POSITION - DERMATOCHALASIS
OD_DERMATOCHALASIS: RLL RUL 1+
OS_DERMATOCHALASIS: LLL LUL 1+

## 2025-03-17 ASSESSMENT — TONOMETRY: OD_IOP_MMHG: 18

## 2025-03-17 ASSESSMENT — REFRACTION_AUTOREFRACTION
OD_AXIS: 099
OD_CYLINDER: -0.75
OD_SPHERE: +3.00
OS_SPHERE: +2.00

## 2025-06-03 PROBLEM — Z12.39 BREAST CANCER SCREENING: Status: ACTIVE | Noted: 2025-06-03

## 2025-06-04 ENCOUNTER — APPOINTMENT (OUTPATIENT)
Dept: GYNECOLOGIC ONCOLOGY | Facility: CLINIC | Age: 75
End: 2025-06-04

## 2025-06-04 DIAGNOSIS — Z12.39 ENCOUNTER FOR OTHER SCREENING FOR MALIGNANT NEOPLASM OF BREAST: ICD-10-CM

## 2025-06-11 ENCOUNTER — APPOINTMENT (OUTPATIENT)
Dept: GYNECOLOGIC ONCOLOGY | Facility: CLINIC | Age: 75
End: 2025-06-11
Payer: MEDICARE

## 2025-06-11 VITALS
HEIGHT: 68 IN | SYSTOLIC BLOOD PRESSURE: 122 MMHG | DIASTOLIC BLOOD PRESSURE: 68 MMHG | BODY MASS INDEX: 39.86 KG/M2 | WEIGHT: 263 LBS

## 2025-06-11 PROCEDURE — 99459 PELVIC EXAMINATION: CPT

## 2025-06-11 PROCEDURE — 99212 OFFICE O/P EST SF 10 MIN: CPT

## 2025-07-08 ENCOUNTER — OFFICE (OUTPATIENT)
Dept: URBAN - METROPOLITAN AREA CLINIC 109 | Facility: CLINIC | Age: 75
Setting detail: OPHTHALMOLOGY
End: 2025-07-08
Payer: MEDICARE

## 2025-07-08 DIAGNOSIS — H02.834: ICD-10-CM

## 2025-07-08 DIAGNOSIS — H35.372: ICD-10-CM

## 2025-07-08 DIAGNOSIS — H40.013: ICD-10-CM

## 2025-07-08 DIAGNOSIS — H34.8322: ICD-10-CM

## 2025-07-08 DIAGNOSIS — H02.835: ICD-10-CM

## 2025-07-08 DIAGNOSIS — H52.7: ICD-10-CM

## 2025-07-08 DIAGNOSIS — H02.831: ICD-10-CM

## 2025-07-08 DIAGNOSIS — H25.13: ICD-10-CM

## 2025-07-08 DIAGNOSIS — D31.42: ICD-10-CM

## 2025-07-08 DIAGNOSIS — E05.00: ICD-10-CM

## 2025-07-08 DIAGNOSIS — H02.832: ICD-10-CM

## 2025-07-08 DIAGNOSIS — G43.909: ICD-10-CM

## 2025-07-08 PROCEDURE — 92133 CPTRZD OPH DX IMG PST SGM ON: CPT | Performed by: OPHTHALMOLOGY

## 2025-07-08 PROCEDURE — 92020 GONIOSCOPY: CPT | Performed by: OPHTHALMOLOGY

## 2025-07-08 PROCEDURE — 92014 COMPRE OPH EXAM EST PT 1/>: CPT | Performed by: OPHTHALMOLOGY

## 2025-07-08 ASSESSMENT — PACHYMETRY
OD_CT_CORRECTION: 0
OS_CT_CORRECTION: 0
OS_CT_UM: 540
OD_CT_UM: 540

## 2025-07-08 ASSESSMENT — LID POSITION - DERMATOCHALASIS
OS_DERMATOCHALASIS: LLL LUL 1+
OD_DERMATOCHALASIS: RLL RUL 1+

## 2025-07-08 ASSESSMENT — REFRACTION_AUTOREFRACTION
OD_CYLINDER: -0.75
OD_AXIS: 099
OD_SPHERE: +3.00
OS_SPHERE: +2.00

## 2025-07-08 ASSESSMENT — VISUAL ACUITY
OS_BCVA: 20/20-2
OD_BCVA: 20/20-1

## 2025-07-08 ASSESSMENT — CONFRONTATIONAL VISUAL FIELD TEST (CVF)
OD_FINDINGS: FULL
OS_FINDINGS: FULL

## 2025-07-08 ASSESSMENT — TONOMETRY
OS_IOP_MMHG: 18
OD_IOP_MMHG: 16

## (undated) DEVICE — DRAPE TOWEL BLUE 17" X 24"

## (undated) DEVICE — MYOSURE TISSUE REMOVAL DEVICE

## (undated) DEVICE — NDL SPINAL 22G X 3.5" (BLACK)

## (undated) DEVICE — DRAPE LIGHT HANDLE COVER (GREEN)

## (undated) DEVICE — XI OBTURATOR OPTICAL BLADELESS 8MM

## (undated) DEVICE — GLV 6 PROTEXIS (WHITE)

## (undated) DEVICE — VENODYNE/SCD SLEEVE CALF MEDIUM

## (undated) DEVICE — GLV 7.5 PROTEXIS (WHITE)

## (undated) DEVICE — TROCAR SURGIQUEST AIRSEAL 8MMX100MM

## (undated) DEVICE — UTERINE MANIPULATOR CONMED VCARE LG 37MM

## (undated) DEVICE — XI TIP COVER

## (undated) DEVICE — SUT VLOC 180 0 9" GS-21 GREEN

## (undated) DEVICE — DRSG KERLIX ROLL 4.5"

## (undated) DEVICE — DRSG OPSITE 2.5 X 2"

## (undated) DEVICE — PLV-SCD MACHINE: Type: DURABLE MEDICAL EQUIPMENT

## (undated) DEVICE — WARMING BLANKET UPPER ADULT

## (undated) DEVICE — SOL IRR BAG NS 0.9% 3000ML

## (undated) DEVICE — XI VESSEL SEALER

## (undated) DEVICE — PREP CHLORAPREP HI-LITE ORANGE 26ML

## (undated) DEVICE — DRAPE MAYO STAND 23"

## (undated) DEVICE — ELCTR GROUNDING PAD ADULT COVIDIEN

## (undated) DEVICE — SUT VICRYL 0 27" UR-6

## (undated) DEVICE — BLADE SURGICAL #11 CARBON

## (undated) DEVICE — SOL INJ NS 0.9% 100ML

## (undated) DEVICE — XI DRAPE COLUMN

## (undated) DEVICE — POSITIONER PINK PAD PIGAZZI SYSTEM

## (undated) DEVICE — FLUENT FMS PROCEDURE KIT

## (undated) DEVICE — SOL IRR POUR H2O 1000ML

## (undated) DEVICE — UTERINE MANIPULATOR CONMED VCARE SM 32MM

## (undated) DEVICE — LIGASURE ATLAS 10MM 20CM

## (undated) DEVICE — TUBING AIRSEAL TRI-LUMEN FILTERED

## (undated) DEVICE — SUT VICRYL 2-0 27" CT-2 UNDYED

## (undated) DEVICE — SYR CONTROL LUER LOK 10CC

## (undated) DEVICE — XI DRAPE ARM

## (undated) DEVICE — XI SEAL UNIV 5- 8 MM

## (undated) DEVICE — VENODYNE/SCD SLEEVE CALF LARGE

## (undated) DEVICE — SOL IRR POUR NS 0.9% 1000ML

## (undated) DEVICE — POSITIONER FOAM EGG CRATE ULNAR 2PCS (PINK)

## (undated) DEVICE — ELCTR CORD FOOTSWITCH 1PLR LAPSCP 10FT

## (undated) DEVICE — PACK LITHOTOMY

## (undated) DEVICE — DRAPE ROBOTIC

## (undated) DEVICE — NDL COUNTER FOAM AND ADHESIVE 40-70

## (undated) DEVICE — MYOSURE SCOPE SEAL

## (undated) DEVICE — LIGASURE BLUNT TIP 37CM

## (undated) DEVICE — SUT MONOCRYL 4-0 27" PS-2 UNDYED

## (undated) DEVICE — UTERINE MANIPULATOR CONMED VCARE MED 34MM

## (undated) DEVICE — PACK ROBOTIC